# Patient Record
Sex: FEMALE | Race: WHITE | Employment: FULL TIME | URBAN - METROPOLITAN AREA
[De-identification: names, ages, dates, MRNs, and addresses within clinical notes are randomized per-mention and may not be internally consistent; named-entity substitution may affect disease eponyms.]

---
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
## 2022-02-10 ENCOUNTER — HOSPITAL ENCOUNTER (OUTPATIENT)
Age: 22
Setting detail: SPECIMEN
Discharge: HOME | End: 2022-02-10

## 2022-02-10 PROCEDURE — 86762 RUBELLA ANTIBODY: CPT

## 2022-02-10 PROCEDURE — 86765 RUBEOLA ANTIBODY: CPT

## 2022-02-10 PROCEDURE — 86735 MUMPS ANTIBODY: CPT

## 2022-02-10 PROCEDURE — 86787 VARICELLA-ZOSTER ANTIBODY: CPT

## 2022-02-11 LAB
MEASLES ANTIBODY IGG: 1.43
MUV IGG SER QL: 0.78
RUBV IGG SER QL: 67.5 IU/ML
VZV IGG SER QL IA: 1.85

## 2022-03-14 ENCOUNTER — INITIAL PRENATAL (OUTPATIENT)
Dept: OBGYN | Age: 22
End: 2022-03-14
Payer: COMMERCIAL

## 2022-03-14 ENCOUNTER — HOSPITAL ENCOUNTER (OUTPATIENT)
Age: 22
Setting detail: SPECIMEN
Discharge: HOME | End: 2022-03-14
Payer: COMMERCIAL

## 2022-03-14 VITALS — DIASTOLIC BLOOD PRESSURE: 62 MMHG | WEIGHT: 151 LBS | SYSTOLIC BLOOD PRESSURE: 106 MMHG | BODY MASS INDEX: 29.25 KG/M2

## 2022-03-14 DIAGNOSIS — Z3A.12 12 WEEKS GESTATION OF PREGNANCY: ICD-10-CM

## 2022-03-14 DIAGNOSIS — Z34.02 ENCOUNTER FOR SUPERVISION OF NORMAL FIRST PREGNANCY IN SECOND TRIMESTER: ICD-10-CM

## 2022-03-14 DIAGNOSIS — Z34.02 ENCOUNTER FOR SUPERVISION OF NORMAL FIRST PREGNANCY IN SECOND TRIMESTER: Primary | ICD-10-CM

## 2022-03-14 LAB
ABO/RH: NORMAL
AMPHETAMINE SCREEN URINE: NEGATIVE
ANTIBODY SCREEN: NEGATIVE
BARBITURATE SCREEN URINE: NEGATIVE
BENZODIAZEPINE SCREEN, URINE: NEGATIVE
BUPRENORPHINE URINE: NEGATIVE
CANNABINOID SCREEN URINE: NEGATIVE
COCAINE METABOLITE, URINE: NEGATIVE
METHADONE SCREEN, URINE: NEGATIVE
METHAMPHETAMINE, URINE: NEGATIVE
OPIATES, URINE: NEGATIVE
OXYCODONE SCREEN URINE: NEGATIVE
PHENCYCLIDINE, URINE: NEGATIVE
PROPOXYPHENE, URINE: NEGATIVE
TRICYCLIC ANTIDEPRESSANTS, UR: NEGATIVE

## 2022-03-14 PROCEDURE — 87389 HIV-1 AG W/HIV-1&-2 AB AG IA: CPT

## 2022-03-14 PROCEDURE — 87086 URINE CULTURE/COLONY COUNT: CPT

## 2022-03-14 PROCEDURE — 87491 CHLMYD TRACH DNA AMP PROBE: CPT

## 2022-03-14 PROCEDURE — 99211 OFF/OP EST MAY X REQ PHY/QHP: CPT | Performed by: ADVANCED PRACTICE MIDWIFE

## 2022-03-14 PROCEDURE — 86901 BLOOD TYPING SEROLOGIC RH(D): CPT

## 2022-03-14 PROCEDURE — 85025 COMPLETE CBC W/AUTO DIFF WBC: CPT

## 2022-03-14 PROCEDURE — 87340 HEPATITIS B SURFACE AG IA: CPT

## 2022-03-14 PROCEDURE — 80306 DRUG TEST PRSMV INSTRMNT: CPT

## 2022-03-14 PROCEDURE — 0500F INITIAL PRENATAL CARE VISIT: CPT | Performed by: ADVANCED PRACTICE MIDWIFE

## 2022-03-14 PROCEDURE — 86780 TREPONEMA PALLIDUM: CPT

## 2022-03-14 PROCEDURE — 86762 RUBELLA ANTIBODY: CPT

## 2022-03-14 PROCEDURE — 86803 HEPATITIS C AB TEST: CPT

## 2022-03-14 PROCEDURE — 86900 BLOOD TYPING SEROLOGIC ABO: CPT

## 2022-03-14 PROCEDURE — 87591 N.GONORRHOEAE DNA AMP PROB: CPT

## 2022-03-14 PROCEDURE — 86850 RBC ANTIBODY SCREEN: CPT

## 2022-03-14 ASSESSMENT — PATIENT HEALTH QUESTIONNAIRE - PHQ9
SUM OF ALL RESPONSES TO PHQ QUESTIONS 1-9: 0
SUM OF ALL RESPONSES TO PHQ9 QUESTIONS 1 & 2: 0
2. FEELING DOWN, DEPRESSED OR HOPELESS: 0
SUM OF ALL RESPONSES TO PHQ QUESTIONS 1-9: 0
SUM OF ALL RESPONSES TO PHQ QUESTIONS 1-9: 0
1. LITTLE INTEREST OR PLEASURE IN DOING THINGS: 0
SUM OF ALL RESPONSES TO PHQ QUESTIONS 1-9: 0

## 2022-03-14 NOTE — PROGRESS NOTES
Kris Vásquez is here at 12w5d for:    Chief Complaint   Patient presents with   Carol Larger Initial Prenatal Visit       Estimated Due Date: Estimated Date of Delivery: 22    OB History    Para Term  AB Living   1             SAB IAB Ectopic Molar Multiple Live Births                    # Outcome Date GA Lbr Augustus/2nd Weight Sex Delivery Anes PTL Lv   1 Current                 History reviewed. No pertinent past medical history. History reviewed. No pertinent surgical history. Social History     Tobacco Use   Smoking Status Never Smoker   Smokeless Tobacco Never Used        Social History     Substance and Sexual Activity   Alcohol Use Not Currently       No results found for this visit on 22. HPI: Patient here today for initial OB visit transfer in from Dr. Saw Stephen. 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 Weight as of this encounter: 151 lb (68.5 kg). BP: 106/62  Weight: 151 lb (68.5 kg)  Patient Position: Sitting         Results reviewed today:    No results found for this visit on 22. See prenatal vital sign section and fetal assessment section    ASSESSMENT & Plan    Diagnosis Orders   1. Encounter for supervision of normal first pregnancy in second trimester  PRENATAL PROFILE I    Prenatal type and screen    HIV Screen    Hepatitis C Antibody    C.trachomatis N.gonorrhoeae DNA, Urine    Culture, Urine    Urine Drug Screen, Comprehensive   2. 12 weeks gestation of pregnancy               I am having Kris MARYVani Vásquez maintain her Prenatal Vit-Fe Fumarate-FA (PRENATAL 19 PO). Return for Keep next appt.     Patient Instructions       Patient Education        Weeks 10 to 14 of Your Pregnancy: Care Instructions  Overview     By weeks 10 to 14 of your pregnancy, the placenta has formed inside your uterus. The placenta's main job is to give your baby oxygen and nutrients through the umbilical cord. It's possible to hear your baby's heartbeat with a special ultrasound device. Your baby's organs are developing. The arms and legs can bend. This is a good time to think about testing for birth defects. There are two types of tests: screening and diagnostic. Screening tests show the chance that a baby has a certain birth defect. They can't tell you for sure that your baby has a problem. Diagnostic tests show if a baby has a certain birth defect. It's your choice whether to have these tests. You and your partner can talk to your doctor or midwife about tests for birth defects. Follow-up care is a key part of your treatment and safety. Be sure to make and go to all appointments, and call your doctor if you are having problems. It's also a good idea to know your test results and keep a list of the medicines you take. How can you care for yourself at home? Decide about tests  · You can have screening tests and diagnostic tests to check for birth defects. The decision to have a test for birth defects is personal. Think about your age, your chance of passing on a family disease, your need to know about any problems, and what you might do after you have the test results. ? Quadruple (quad) blood test. This screening test can be done between 15 and 22 weeks of pregnancy. It checks the amount of four substances in your blood. The doctor looks at these test results, along with your age and other factors, to find out the chance that your baby may have certain problems. ? Amniocentesis. This diagnostic test is used to look for chromosomal problems in the baby's cells. It can be done between 15 and 20 weeks of pregnancy, usually around week 16.  ? Nuchal translucency test. This test uses ultrasound to measure the thickness of the area at the back of the baby's neck.  An increase in the thickness can be an early sign of Down syndrome. ? Chorionic villus sampling (CVS). This is a test that looks for certain genetic problems with your baby. The same genes that are in your baby are in the placenta. A small piece of the placenta is taken out and tested. This test is done when you are 10 to 13 weeks pregnant. Ease discomfort  · Slow down and take naps when you feel tired. · If your emotions swing, talk to someone. · If your gums bleed, try a softer toothbrush. If your gums are puffy and bleed a lot, see your dentist.  · If you feel dizzy:  ? Get up slowly after sitting or lying down. ? Drink plenty of fluids. ? Eat small snacks to keep your blood sugar stable. ? Put your head between your legs as though you were tying your shoelaces. ? Lie down with your legs higher than your head. Use pillows to prop up your feet. · If you have a headache:  ? Lie down. ? Ask your partner or a good friend for a neck massage. ? Try cool cloths over your forehead or across the back of your neck. ? Use acetaminophen (Tylenol) for pain relief. Do not use nonsteroidal anti-inflammatory drugs (NSAIDs), such as ibuprofen (Advil, Motrin) or naproxen (Aleve), unless your doctor says it is okay. · If you have a nosebleed, pinch your nose gently, and hold it for a short while. To prevent nosebleeds, try massaging a small dab of petroleum jelly, such as Vaseline, in your nostrils. · If your nose is stuffed up, try saline (saltwater) nose sprays. Do not use decongestant sprays. Care for your breasts  · Wear a bra that gives you good support. · Know that changes in your breasts are normal.  ? Your breasts may get larger and more tender. Tenderness usually gets better by 12 weeks. ? Your nipples may get darker and larger, and small bumps around your nipples may show more. ? The veins in your chest and breasts may show more. Where can you learn more? Go to https://cherelle.[employer]. org and sign in to your newScale account.  Enter K728 in cooking and housekeeping.     · Figure out who or what causes your stress. Avoid these people or situations as much as possible.     · Relax every day. Taking 10- to 15-minute breaks can make a big difference. Take a walk, listen to music, or take a warm bath.     · Learn relaxation techniques at prenatal or yoga class. Or buy a relaxation tape.     · List your fears about having a baby and becoming a parent. Share the list with someone you trust. Decide which worries are really small, and try to let them go. Exercise    · If you did not exercise much before pregnancy, start slowly. Walking is best. [employer], and do a little more every day.     · Brisk walking, easy jogging, low-impact aerobics, water aerobics, and yoga are good choices. Some sports, such as scuba diving, horseback riding, downhill skiing, gymnastics, and water skiing, are not a good idea.     · Try to do at least 2½ hours a week of moderate exercise, such as a fast walk. One way to do this is to be active 30 minutes a day, at least 5 days a week.     · Wear loose clothing. And wear shoes and a bra that provide good support.     · Warm up and cool down to start and finish your exercise.     · If you want to use weights, be sure to use light weights. They reduce stress on your joints. Stay at the best weight for you    · Experts recommend that you gain about 1 pound a month during the first 3 months of your pregnancy.     · Experts recommend that you gain about 1 pound a week during your last 6 months of pregnancy, for a total weight gain of 25 to 35 pounds.     · If you are underweight, you will need to gain more weight (about 28 to 40 pounds).     · If you are overweight, you may not need to gain as much weight (about 15 to 25 pounds).     · If you are gaining weight too fast, use common sense. Exercise every day, and limit sweets, fast foods, and fats.  Choose lean meats, fruits, and vegetables.     · If you are having twins or more, your doctor may refer you to a dietitian. Where can you learn more? Go to https://chpepiceweb.health-partners. org and sign in to your "[employer]t account. Enter Q910 in the [facility] box to learn more about \"Weeks 14 to 18 of Your Pregnancy: Care Instructions. \"     If you do not have an account, please click on the \"Sign Up Now\" link. Current as of: 2021               Content Version: 13.1  © 2166-1757 Healthwise, Incorporated. Care instructions adapted under license by [facility] ([facility]). If you have questions about a medical condition or this instruction, always ask your healthcare professional. [place] [address] any warranty or liability for your use of this information. Childbirth Education 2022 &  ( 6pm -9:30)     &  ( 6:00-9:30pm)     (Saturday 9am-4pm)     ( 1pm-5pm) accelerated class      There is no charge for classes. Please bring a pillow to class. Bring a support person. To sign up for classes  Call the Obstetrics Department at  [facility] at  [phone]          BREASTFEEDING classes     Classes are held in the [address] [facility]     Breastfeeding     ZOOM CLASS- (Wednesday 12 pm-1:30 pm)       (Tuesday 11 am - 1 pm)      (Monday 5:30-7:30 pm)        these classes are free    To sign up for classes  call the Obstetrics Department at  [facility] at  [phone]  More classes will be added as instructor availability increases. Thank you for your patience! Learn and GO with Inge              Scan the QR Code  below to access   parent education powered  by [employer].     [employer] gives you access to:     Prenatal   Labor and birth   Postpartum care   Breastfeeding   [place] care  Scan the QR code to access  [employer] at [facility] ([facility]) -- [address] [facility], APRN - CNM,3/14/2022 4:53 PM

## 2022-03-14 NOTE — PATIENT INSTRUCTIONS
Patient Education        Weeks 10 to 14 of Your Pregnancy: Care Instructions  Overview     By weeks 10 to 15 of your pregnancy, the placenta has formed inside your uterus. The placenta's main job is to give your baby oxygen and nutrients through the umbilical cord. It's possible to hear your baby's heartbeat with a special ultrasound device. Your baby's organs are developing. The arms and legs can bend. This is a good time to think about testing for birth defects. There are two types of tests: screening and diagnostic. Screening tests show the chance that a baby has a certain birth defect. They can't tell you for sure that your baby has a problem. Diagnostic tests show if a baby has a certain birth defect. It's your choice whether to have these tests. You and your partner can talk to your doctor or midwife about tests for birth defects. Follow-up care is a key part of your treatment and safety. Be sure to make and go to all appointments, and call your doctor if you are having problems. It's also a good idea to know your test results and keep a list of the medicines you take. How can you care for yourself at home? Decide about tests  · You can have screening tests and diagnostic tests to check for birth defects. The decision to have a test for birth defects is personal. Think about your age, your chance of passing on a family disease, your need to know about any problems, and what you might do after you have the test results. ? Quadruple (quad) blood test. This screening test can be done between 15 and 22 weeks of pregnancy. It checks the amount of four substances in your blood. The doctor looks at these test results, along with your age and other factors, to find out the chance that your baby may have certain problems. ? Amniocentesis. This diagnostic test is used to look for chromosomal problems in the baby's cells.  It can be done between 15 and 20 weeks of pregnancy, usually around week 16.  ? Nuchal translucency test. This test uses ultrasound to measure the thickness of the area at the back of the baby's neck. An increase in the thickness can be an early sign of Down syndrome. ? Chorionic villus sampling (CVS). This is a test that looks for certain genetic problems with your baby. The same genes that are in your baby are in the placenta. A small piece of the placenta is taken out and tested. This test is done when you are 10 to 13 weeks pregnant. Ease discomfort  · Slow down and take naps when you feel tired. · If your emotions swing, talk to someone. · If your gums bleed, try a softer toothbrush. If your gums are puffy and bleed a lot, see your dentist.  · If you feel dizzy:  ? Get up slowly after sitting or lying down. ? Drink plenty of fluids. ? Eat small snacks to keep your blood sugar stable. ? Put your head between your legs as though you were tying your shoelaces. ? Lie down with your legs higher than your head. Use pillows to prop up your feet. · If you have a headache:  ? Lie down. ? Ask your partner or a good friend for a neck massage. ? Try cool cloths over your forehead or across the back of your neck. ? Use acetaminophen (Tylenol) for pain relief. Do not use nonsteroidal anti-inflammatory drugs (NSAIDs), such as ibuprofen (Advil, Motrin) or naproxen (Aleve), unless your doctor says it is okay. · If you have a nosebleed, pinch your nose gently, and hold it for a short while. To prevent nosebleeds, try massaging a small dab of petroleum jelly, such as Vaseline, in your nostrils. · If your nose is stuffed up, try saline (saltwater) nose sprays. Do not use decongestant sprays. Care for your breasts  · Wear a bra that gives you good support. · Know that changes in your breasts are normal.  ? Your breasts may get larger and more tender. Tenderness usually gets better by 12 weeks. ? Your nipples may get darker and larger, and small bumps around your nipples may show more. ?  The veins in your chest and breasts may show more. Where can you learn more? Go to https://chpepiceweb.healthSkyengpartners. org and sign in to your [employer] account. Enter B556 in the [facility] box to learn more about \"Weeks 10 to 14 of Your Pregnancy: Care Instructions. \"     If you do not have an account, please click on the \"Sign Up Now\" link. Current as of: June 16, 2021               Content Version: 13.1  © 3876-9355 [employer]. Care instructions adapted under license by [facility] ([facility]). If you have questions about a medical condition or this instruction, always ask your healthcare professional. [place] [address] any warranty or liability for your use of this information. Patient Education        Weeks 14 to 18 of Your Pregnancy: Care Instructions  Overview     During this time, you may start to \"show,\" so that you look pregnant to people around you. You may also notice some changes in your skin, such as itchy spots on your palms or acne on your face. Your baby is now able to pass urine. And your baby's first stool (meconium) is starting to collect in your baby's intestines. Hair is also starting to grow on your baby's head. At your next visit, between weeks 18 and 20, your doctor may do an ultrasound test. The test allows your doctor to check for certain problems. Your doctor can also tell the sex of your baby. So this a good time to think about whether you want to know. Talk to your doctor about getting a flu shot to help keep you healthy during your pregnancy. As your pregnancy moves along, it's common to worry or feel anxious. Your body is changing a lot. And you are thinking about giving birth, the health of your baby, and becoming a parent. You can talk to your doctor about any anxiety and stress you feel. Follow-up care is a key part of your treatment and safety. Be sure to make and go to all appointments, and call your doctor if you are having problems.  It's also a good idea to know your test results and keep a list of the medicines you take. How can you care for yourself at home? Reduce stress    · Ask for help with cooking and housekeeping.     · Figure out who or what causes your stress. Avoid these people or situations as much as possible.     · Relax every day. Taking 10- to 15-minute breaks can make a big difference. Take a walk, listen to music, or take a warm bath.     · Learn relaxation techniques at prenatal or yoga class. Or buy a relaxation tape.     · List your fears about having a baby and becoming a parent. Share the list with someone you trust. Decide which worries are really small, and try to let them go. Exercise    · If you did not exercise much before pregnancy, start slowly. Walking is best. [employer], and do a little more every day.     · Brisk walking, easy jogging, low-impact aerobics, water aerobics, and yoga are good choices. Some sports, such as scuba diving, horseback riding, downhill skiing, gymnastics, and water skiing, are not a good idea.     · Try to do at least 2½ hours a week of moderate exercise, such as a fast walk. One way to do this is to be active 30 minutes a day, at least 5 days a week.     · Wear loose clothing. And wear shoes and a bra that provide good support.     · Warm up and cool down to start and finish your exercise.     · If you want to use weights, be sure to use light weights. They reduce stress on your joints.    Stay at the best weight for you    · Experts recommend that you gain about 1 pound a month during the first 3 months of your pregnancy.     · Experts recommend that you gain about 1 pound a week during your last 6 months of pregnancy, for a total weight gain of 25 to 35 pounds.     · If you are underweight, you will need to gain more weight (about 28 to 40 pounds).     · If you are overweight, you may not need to gain as much weight (about 15 to 25 pounds).     · If you are gaining weight too fast, use common sense. Exercise every day, and limit sweets, fast foods, and fats. Choose lean meats, fruits, and vegetables.     · If you are having twins or more, your doctor may refer you to a dietitian. Where can you learn more? Go to https://chbobo.healthWellntelpartners. org and sign in to your [employer] account. Enter X517 in the [facility] box to learn more about \"Weeks 14 to 18 of Your Pregnancy: Care Instructions. \"     If you do not have an account, please click on the \"Sign Up Now\" link. Current as of: June 16, 2021               Content Version: 13.1  © 9667-5802 Healthwise, [employer]. Care instructions adapted under license by [facility] ([facility]). If you have questions about a medical condition or this instruction, always ask your healthcare professional. [address] any warranty or liability for your use of this information. Childbirth Education 2022 January 18 & 25 (Tuesdays 6pm -9:30)    February 9 & 23 (Wednesdays 6:00-9:30pm)    February 5 (Saturday 9am-4pm)    March 6 (Sunday 1pm-5pm) accelerated class      There is no charge for classes. Please bring a pillow to class. Bring a support person. To sign up for classes  Call the Obstetrics Department at  [facility] at  [phone]          BREASTFEEDING classes 2022    Classes are held in the [address] [facility] [address]    Breastfeeding    January 26 ZOOM CLASS- (Wednesday 12 pm-1:30 pm)    February 1   (Tuesday 11 am - 1 pm)     March 7 (Monday 5:30-7:30 pm)        these classes are free    To sign up for classes  call the Obstetrics Department at  [facility] at  [phone]  More classes will be added as instructor availability increases. Thank you for your patience! Learn and GO with Inge Wilburn the QR Code  below to access 24/7  parent education powered  by [employer].     [employer] gives you access to:     Prenatal   Labor and birth  Kera Busch Postpartum care   Breastfeeding   [place] care  Scan the QR code to access  [place] at [facility].

## 2022-03-14 NOTE — PROGRESS NOTES
New OB Visit    Date of service: 3/14/2022    Kris Vásquez  Is a 19 y.o.  female presenting for a New OB visit with Nurse. Name of Father of Tracie Perla is Nathalie Vásquez and is involved. Pt does work at [employer]. Pt is not Fertility pt. PT's PCP is: Marsha Gonzalez DO     : 2000                                             Subjective:       Patient's last menstrual period was 12/15/2021. OB History    Para Term  AB Living   1             SAB IAB Ectopic Molar Multiple Live Births                    # Outcome Date GA Lbr Augustus/2nd Weight Sex Delivery Anes PTL Lv   1 Current                      Social History     Tobacco Use   Smoking Status Never Smoker   Smokeless Tobacco Never Used        Social History     Substance and Sexual Activity   Alcohol Use Not Currently        Allergies: Patient has no known allergies. Current Outpatient Medications:     Prenatal Vit-Fe Fumarate-FA (PRENATAL 19 PO), Take by mouth, Disp: , Rfl:       Vital Signs Last menstrual period 12/15/2021. No results found for this visit on 22. Pain: none                            Nausea: no      Vomiting: no        Breast enlargement or tenderness: yes    Frequency of urination:yes      Fatigue: no         Patient history reviewed. Educational materials given and genetic testing reviewed. Breastfeeding handouts given and reviewed: Yes     If BMI over 35 was HgA1C drawn: N/A      If history of thyroid problem was TSH drawn: no       My chart set up and activated: Yes    If history of preeclampsia did we start baby ASA: N/A    If history of  delivery - did we set up progesterone injections:  N/A    Does pt have a history of DVT? no  If yes start Lovenox 40 mg daily    Is pt allergic to PCN? No:   If yes order U/A to culture and sensitivity if positive. Education:  There are no Patient Instructions on file for this visit. Assessment:      Diagnosis Orders   1.  Encounter for supervision of normal first pregnancy in second trimester     2.  Amenorrhea         Plan: Order Routine Prenatal Lab Yes     Order additional testing if requested         Order Prenatal Vitamins   No: OTC             Appointment with Ronni Rodríguez CNM in 3 weeks for Dating U/S and total body exam if indicated      Nurse:   James Lucas

## 2022-03-15 LAB
ABSOLUTE EOS #: 0.03 K/UL (ref 0–0.44)
ABSOLUTE IMMATURE GRANULOCYTE: 0.08 K/UL (ref 0–0.3)
ABSOLUTE LYMPH #: 1.96 K/UL (ref 1.1–3.7)
ABSOLUTE MONO #: 0.81 K/UL (ref 0.1–1.2)
BASOPHILS # BLD: 0 % (ref 0–2)
BASOPHILS ABSOLUTE: 0.03 K/UL (ref 0–0.2)
CULTURE: NORMAL
EOSINOPHILS RELATIVE PERCENT: 0 % (ref 1–4)
HCT VFR BLD CALC: 41.2 % (ref 36.3–47.1)
HEMOGLOBIN: 13.8 G/DL (ref 11.9–15.1)
HEPATITIS B SURFACE ANTIGEN: NONREACTIVE
HEPATITIS C ANTIBODY: NONREACTIVE
HIV AG/AB: NONREACTIVE
IMMATURE GRANULOCYTES: 1 %
LYMPHOCYTES # BLD: 19 % (ref 24–43)
MCH RBC QN AUTO: 31.4 PG (ref 25.2–33.5)
MCHC RBC AUTO-ENTMCNC: 33.5 G/DL (ref 28.4–34.8)
MCV RBC AUTO: 93.6 FL (ref 82.6–102.9)
MONOCYTES # BLD: 8 % (ref 3–12)
NRBC AUTOMATED: 0 PER 100 WBC
PDW BLD-RTO: 12.1 % (ref 11.8–14.4)
PLATELET # BLD: 311 K/UL (ref 138–453)
PMV BLD AUTO: 10.1 FL (ref 8.1–13.5)
RBC # BLD: 4.4 M/UL (ref 3.95–5.11)
RUBV IGG SER QL: 72.5 IU/ML
SEG NEUTROPHILS: 72 % (ref 36–65)
SEGMENTED NEUTROPHILS ABSOLUTE COUNT: 7.48 K/UL (ref 1.5–8.1)
SPECIMEN DESCRIPTION: NORMAL
T. PALLIDUM, IGG: NONREACTIVE
WBC # BLD: 10.4 K/UL (ref 3.5–11.3)

## 2022-03-16 LAB
C. TRACHOMATIS DNA ,URINE: NEGATIVE
N. GONORRHOEAE DNA, URINE: NEGATIVE
SPECIMEN DESCRIPTION: NORMAL

## 2022-04-04 ENCOUNTER — ROUTINE PRENATAL (OUTPATIENT)
Dept: OBGYN | Age: 22
End: 2022-04-04

## 2022-04-04 VITALS — DIASTOLIC BLOOD PRESSURE: 82 MMHG | SYSTOLIC BLOOD PRESSURE: 132 MMHG | BODY MASS INDEX: 29.25 KG/M2 | WEIGHT: 151 LBS

## 2022-04-04 DIAGNOSIS — Z34.02 SUPERVISION OF NORMAL FIRST PREGNANCY IN SECOND TRIMESTER: ICD-10-CM

## 2022-04-04 DIAGNOSIS — Z3A.15 15 WEEKS GESTATION OF PREGNANCY: Primary | ICD-10-CM

## 2022-04-04 PROCEDURE — 0502F SUBSEQUENT PRENATAL CARE: CPT | Performed by: ADVANCED PRACTICE MIDWIFE

## 2022-04-04 NOTE — PROGRESS NOTES
Kris Vásquez is here at 15w5d for:    Chief Complaint   Patient presents with    Routine Prenatal Visit     pt states no issues or concerns        Estimated Due Date: Estimated Date of Delivery: 22    OB History    Para Term  AB Living   1             SAB IAB Ectopic Molar Multiple Live Births                    # Outcome Date GA Lbr Augustus/2nd Weight Sex Delivery Anes PTL Lv   1 Current                 History reviewed. No pertinent past medical history. History reviewed. No pertinent surgical history. Social History     Tobacco Use   Smoking Status Never Smoker   Smokeless Tobacco Never Used        Social History     Substance and Sexual Activity   Alcohol Use Not Currently       No results found for this visit on 22. HPI: Patient here today for OB visit. Patient states she is doing well and denies needs at this time    Yes PT denies fever, chills, nausea and vomiting       Vitals:  Estimated body mass index is 29.25 kg/m² as calculated from the following:    Height as of 17: 5' 0.25\" (1.53 m). Weight as of this encounter: 151 lb (68.5 kg). BP: 132/82  Weight: 151 lb (68.5 kg)  Patient Position: Sitting  Albumin: Negative  Glucose: Negative  Fetal Heart Rate: 148  Movement: Absent           Abdomen: u/3 deep  Results reviewed today:    No results found for this visit on 22. ASSESSMENT & Plan    Diagnosis Orders   1. 15 weeks gestation of pregnancy     2. Supervision of normal first pregnancy in second trimester               I am having Kris Vásquez maintain her Prenatal Vit-Fe Fumarate-FA (PRENATAL 19 PO). Return for Keep next appt. Patient Instructions       Patient Education        Weeks 14 to 25 of Your Pregnancy: Care Instructions  Overview     During this time, you may start to \"show,\" so that you look pregnant to people around you. You may also notice some changes in your skin, such as itchy spotson your palms or acne on your face.   Your baby is now able to pass urine. And your baby's first stool (meconium) is starting to collect in your baby's intestines. Hair is also starting to grow onyour baby's head. At your next visit, between weeks 18 and 20, your doctor may do an ultrasound test. The test allows your doctor to check for certain problems. Your doctor can also tell the sex of your baby. So this a good time to think about whetheryou want to know. Talk to your doctor about getting a flu shot to help keep you healthy duringyour pregnancy. As your pregnancy moves along, it's common to worry or feel anxious. Your body is changing a lot. And you are thinking about giving birth, the health of your baby, and becoming a parent. You can talk to your doctor about any anxiety andstress you feel. Follow-up care is a key part of your treatment and safety. Be sure to make and go to all appointments, and call your doctor if you are having problems. It's also a good idea to know your test results and keep alist of the medicines you take. How can you care for yourself at home? Reduce stress     Ask for help with cooking and housekeeping.      Figure out who or what causes your stress. Avoid these people or situations as much as possible.      Relax every day. Taking 10- to 15-minute breaks can make a big difference. Take a walk, listen to music, or take a warm bath.      Learn relaxation techniques at prenatal or yoga class. Or buy a relaxation tape.      List your fears about having a baby and becoming a parent. Share the list with someone you trust. Decide which worries are really small, and try to let them go. Exercise     If you did not exercise much before pregnancy, start slowly. Walking is best. [employer], and do a little more every day.      Brisk walking, easy jogging, low-impact aerobics, water aerobics, and yoga are good choices.  Some sports, such as scuba diving, horseback riding, downhill skiing, gymnastics, and water skiing, are not a good idea.      Try to do at least 2½ hours a week of moderate exercise, such as a fast walk. One way to do this is to be active 30 minutes a day, at least 5 days a week.      Wear loose clothing. And wear shoes and a bra that provide good support.      Warm up and cool down to start and finish your exercise.      If you want to use weights, be sure to use light weights. They reduce stress on your joints. Stay at the best weight for you     Experts recommend that you gain about 1 pound a month during the first 3 months of your pregnancy.      Experts recommend that you gain about 1 pound a week during your last 6 months of pregnancy, for a total weight gain of 25 to 35 pounds.      If you are underweight, you will need to gain more weight (about 28 to 40 pounds).      If you are overweight, you may not need to gain as much weight (about 15 to 25 pounds).      If you are gaining weight too fast, use common sense. Exercise every day, and limit sweets, fast foods, and fats. Choose lean meats, fruits, and vegetables.      If you are having twins or more, your doctor may refer you to a dietitian. Where can you learn more? Go to https://ICE Entertainment.[employer]el Booking Solutions Incorporated. org and sign in to your Eventure Interactive account. Enter Z629 in the [facility] box to learn more about \"Weeks 14 to 18 of Your Pregnancy: Care Instructions. \"     If you do not have an account, please click on the \"Sign Up Now\" link. Current as of: June 16, 2021               Content Version: 13.2  © 2006-2022 Healthwise, Incorporated. Care instructions adapted under license by [facility] ([facility]). If you have questions about a medical condition or this instruction, always ask your healthcare professional. [place] [address] any warranty or liability for your use of this information.                    GORDO Mccrary CNM,4/4/2022 4:21 PM

## 2022-04-04 NOTE — PATIENT INSTRUCTIONS
Patient Education        Weeks 14 to 18 of Your Pregnancy: Care Instructions  Overview     During this time, you may start to \"show,\" so that you look pregnant to people around you. You may also notice some changes in your skin, such as itchy spotson your palms or acne on your face. Your baby is now able to pass urine. And your baby's first stool (meconium) is starting to collect in your baby's intestines. Hair is also starting to grow onyour baby's head. At your next visit, between weeks 18 and 20, your doctor may do an ultrasound test. The test allows your doctor to check for certain problems. Your doctor can also tell the sex of your baby. So this a good time to think about whetheryou want to know. Talk to your doctor about getting a flu shot to help keep you healthy duringyour pregnancy. As your pregnancy moves along, it's common to worry or feel anxious. Your body is changing a lot. And you are thinking about giving birth, the health of your baby, and becoming a parent. You can talk to your doctor about any anxiety andstress you feel. Follow-up care is a key part of your treatment and safety. Be sure to make and go to all appointments, and call your doctor if you are having problems. It's also a good idea to know your test results and keep alist of the medicines you take. How can you care for yourself at home? Reduce stress     Ask for help with cooking and housekeeping.      Figure out who or what causes your stress. Avoid these people or situations as much as possible.      Relax every day. Taking 10- to 15-minute breaks can make a big difference. Take a walk, listen to music, or take a warm bath.      Learn relaxation techniques at prenatal or yoga class. Or buy a relaxation tape.      List your fears about having a baby and becoming a parent. Share the list with someone you trust. Decide which worries are really small, and try to let them go.    Exercise     If you did not exercise much before pregnancy, start slowly. Walking is best. [employer], and do a little more every day.      Brisk walking, easy jogging, low-impact aerobics, water aerobics, and yoga are good choices. Some sports, such as scuba diving, horseback riding, downhill skiing, gymnastics, and water skiing, are not a good idea.      Try to do at least 2½ hours a week of moderate exercise, such as a fast walk. One way to do this is to be active 30 minutes a day, at least 5 days a week.      Wear loose clothing. And wear shoes and a bra that provide good support.      Warm up and cool down to start and finish your exercise.      If you want to use weights, be sure to use light weights. They reduce stress on your joints. Stay at the best weight for you     Experts recommend that you gain about 1 pound a month during the first 3 months of your pregnancy.      Experts recommend that you gain about 1 pound a week during your last 6 months of pregnancy, for a total weight gain of 25 to 35 pounds.      If you are underweight, you will need to gain more weight (about 28 to 40 pounds).      If you are overweight, you may not need to gain as much weight (about 15 to 25 pounds).      If you are gaining weight too fast, use common sense. Exercise every day, and limit sweets, fast foods, and fats. Choose lean meats, fruits, and vegetables.      If you are having twins or more, your doctor may refer you to a dietitian. Where can you learn more? Go to https://LoopportlavernTanner Research.Getyoo. org and sign in to your [employer] account. Enter Y252 in the [facility] box to learn more about \"Weeks 14 to 18 of Your Pregnancy: Care Instructions. \"     If you do not have an account, please click on the \"Sign Up Now\" link. Current as of: June 16, 2021               Content Version: 13.2  © 6395-0711 Healthwise, Incorporated. Care instructions adapted under license by [facility] ([facility]).  If you have questions about a medical condition or this instruction, always ask your healthcare professional. [place] [address] any warranty or liability for your use of this information.

## 2022-05-02 ENCOUNTER — ROUTINE PRENATAL (OUTPATIENT)
Dept: OBGYN | Age: 22
End: 2022-05-02

## 2022-05-02 VITALS — DIASTOLIC BLOOD PRESSURE: 74 MMHG | SYSTOLIC BLOOD PRESSURE: 120 MMHG | WEIGHT: 154.2 LBS | BODY MASS INDEX: 29.87 KG/M2

## 2022-05-02 DIAGNOSIS — Z36.89 SCREENING, ANTENATAL, FOR FETAL ANATOMIC SURVEY: ICD-10-CM

## 2022-05-02 DIAGNOSIS — Z3A.20 20 WEEKS GESTATION OF PREGNANCY: Primary | ICD-10-CM

## 2022-05-02 PROCEDURE — 0502F SUBSEQUENT PRENATAL CARE: CPT | Performed by: ADVANCED PRACTICE MIDWIFE

## 2022-05-02 NOTE — PATIENT INSTRUCTIONS
Patient Education        Learning About When to Call Your Doctor During Pregnancy (After 20 Weeks)  Overview  It's common to have concerns about what might be a problem when you're pregnant. Most pregnancies don't have any serious problems. But it's still important to know when to call your doctor if you have certain symptoms orsigns of labor. These are general suggestions. Your doctor may give you some more informationabout when to call. When to call your doctor (after 20 weeks)  Call 911 anytime you think you may need emergency care. For example, call if:   You have severe vaginal bleeding.  You have sudden, severe pain in your belly.  You passed out (lost consciousness).  You have a seizure.  You see or feel the umbilical cord.  You think you are about to deliver your baby and can't make it safely to the hospital.  Call your doctor now or seek immediate medical care if:   You have vaginal bleeding.  You have belly pain.  You have a fever.  You have symptoms of preeclampsia, such as:  ? Sudden swelling of your face, hands, or feet. ? New vision problems (such as dimness, blurring, or seeing spots). ? A severe headache.  You have a sudden release of fluid from your vagina. (You think your water broke.)   You think that you may be in labor. This means that you've had at least 6 contractions in an hour.  You notice that your baby has stopped moving or is moving much less than normal.   You have symptoms of a urinary tract infection. These may include:  ? Pain or burning when you urinate. ? A frequent need to urinate without being able to pass much urine. ? Pain in the flank, which is just below the rib cage and above the waist on either side of the back. ? Blood in your urine. Watch closely for changes in your health, and be sure to contact your doctor if:   You have vaginal discharge that smells bad.  You have skin changes, such as:  ? A rash. ? Itching.   ? Yellow color to your skin.   You have other concerns about your pregnancy. If you have labor signs at 37 weeks or more  If you have signs of labor at 37 weeks or more, your doctor may tell you tocall when your labor becomes more active. Symptoms of active labor include:   Contractions that are regular.  Contractions that are less than 5 minutes apart.  Contractions that are hard to talk through. Follow-up care is a key part of your treatment and safety. Be sure to make and go to all appointments, and call your doctor if you are having problems. It's also a good idea to know your test results and keep alist of the medicines you take. Where can you learn more? Go to https://KonnektidpeConcard.Ciapple. org and sign in to your SiteJabber account. Enter  in the Kaggle box to learn more about \"Learning About When to Call Your Doctor During Pregnancy (After 20 Weeks). \"     If you do not have an account, please click on the \"Sign Up Now\" link. Current as of: June 16, 2021               Content Version: 13.2  © 2006-2022 Healthwise, Incorporated. Care instructions adapted under license by [facility] ([facility]). If you have questions about a medical condition or this instruction, always ask your healthcare professional. [place] [address] any warranty or liability for your use of this information.

## 2022-05-02 NOTE — PROGRESS NOTES
Kriswilliam Vásquez is here at 19w5d for:    Chief Complaint   Patient presents with    Routine Prenatal Visit     F/U in house 20 week u/s. girl. pt states no issues or concerns        Estimated Due Date: Estimated Date of Delivery: 22    OB History    Para Term  AB Living   1             SAB IAB Ectopic Molar Multiple Live Births                    # Outcome Date GA Lbr Augustus/2nd Weight Sex Delivery Anes PTL Lv   1 Current                 History reviewed. No pertinent past medical history. History reviewed. No pertinent surgical history. Social History     Tobacco Use   Smoking Status Never Smoker   Smokeless Tobacco Never Used        Social History     Substance and Sexual Activity   Alcohol Use Not Currently       No results found for this visit on 22. HPI: Patient here today for OB visit and 20-week ultrasound. Patient denies needs or concerns at this time    Yes PT denies fever, chills, nausea and vomiting       Vitals:  Estimated body mass index is 29.87 kg/m² as calculated from the following:    Height as of 17: 5' 0.25\" (1.53 m). Weight as of this encounter: 154 lb 3.2 oz (69.9 kg). BP: 120/74  Weight: 154 lb 3.2 oz (69.9 kg)  Patient Position: Sitting  Albumin: Negative  Glucose: Negative  Fetal Heart Rate: 140  Movement: Present           Abdomen: soft    Results reviewed today:       19.6 WK IUP  CL: 4.7 cm  HR: 140 bpm  Anterior placenta, breech presentation  Ovaries: WNL  Gender:  female  Active fetal movements  All visualized fetal anatomy WNL   CP cysts noted      ASSESSMENT & Plan    Diagnosis Orders   1. 20 weeks gestation of pregnancy     2. Screening, , for fetal anatomic survey               I am having Kris HERNANDEZVani Fahad maintain her Prenatal Vit-Fe Fumarate-FA (PRENATAL 19 PO). Return in about 4 weeks (around 2022) for OB & 7 wk OB GTT.     Patient Instructions     Patient Education        Learning About When to Call Your Doctor During Pregnancy (After 20 Weeks)  Overview  It's common to have concerns about what might be a problem when you're pregnant. Most pregnancies don't have any serious problems. But it's still important to know when to call your doctor if you have certain symptoms orsigns of labor. These are general suggestions. Your doctor may give you some more informationabout when to call. When to call your doctor (after 20 weeks)  Call 911 anytime you think you may need emergency care. For example, call if:   You have severe vaginal bleeding.  You have sudden, severe pain in your belly.  You passed out (lost consciousness).  You have a seizure.  You see or feel the umbilical cord.  You think you are about to deliver your baby and can't make it safely to the hospital.  Call your doctor now or seek immediate medical care if:   You have vaginal bleeding.  You have belly pain.  You have a fever.  You have symptoms of preeclampsia, such as:  ? Sudden swelling of your face, hands, or feet. ? New vision problems (such as dimness, blurring, or seeing spots). ? A severe headache.  You have a sudden release of fluid from your vagina. (You think your water broke.)   You think that you may be in labor. This means that you've had at least 6 contractions in an hour.  You notice that your baby has stopped moving or is moving much less than normal.   You have symptoms of a urinary tract infection. These may include:  ? Pain or burning when you urinate. ? A frequent need to urinate without being able to pass much urine. ? Pain in the flank, which is just below the rib cage and above the waist on either side of the back. ? Blood in your urine. Watch closely for changes in your health, and be sure to contact your doctor if:   You have vaginal discharge that smells bad.  You have skin changes, such as:  ? A rash. ? Itching. ? Yellow color to your skin.  You have other concerns about your pregnancy.   If you have labor signs at 37 weeks or more  If you have signs of labor at 37 weeks or more, your doctor may tell you tocall when your labor becomes more active. Symptoms of active labor include:   Contractions that are regular.  Contractions that are less than 5 minutes apart.  Contractions that are hard to talk through. Follow-up care is a key part of your treatment and safety. Be sure to make and go to all appointments, and call your doctor if you are having problems. It's also a good idea to know your test results and keep alist of the medicines you take. Where can you learn more? Go to https://EyesBotpeSwitchcam.Excep Apps. org and sign in to your Cozy Cloud account. Enter  in the [employer] box to learn more about \"Learning About When to Call Your Doctor During Pregnancy (After 20 Weeks). \"     If you do not have an account, please click on the \"Sign Up Now\" link. Current as of: June 16, 2021               Content Version: 13.2  © 2006-2022 Healthwise, Incorporated. Care instructions adapted under license by [facility] ([facility]). If you have questions about a medical condition or this instruction, always ask your healthcare professional. [place] [address] any warranty or liability for your use of this information.                    GORDO Marcelino CNM,5/2/2022 4:41 PM

## 2022-06-02 ENCOUNTER — ROUTINE PRENATAL (OUTPATIENT)
Dept: OBGYN | Age: 22
End: 2022-06-02

## 2022-06-02 VITALS
BODY MASS INDEX: 31.57 KG/M2 | SYSTOLIC BLOOD PRESSURE: 122 MMHG | HEART RATE: 82 BPM | DIASTOLIC BLOOD PRESSURE: 74 MMHG | WEIGHT: 163 LBS

## 2022-06-02 DIAGNOSIS — Z34.02 SUPERVISION OF NORMAL FIRST PREGNANCY IN SECOND TRIMESTER: ICD-10-CM

## 2022-06-02 DIAGNOSIS — Z3A.24 24 WEEKS GESTATION OF PREGNANCY: Primary | ICD-10-CM

## 2022-06-02 PROCEDURE — 0502F SUBSEQUENT PRENATAL CARE: CPT | Performed by: ADVANCED PRACTICE MIDWIFE

## 2022-06-02 NOTE — PROGRESS NOTES
Kris Vásquez is here at 24w1d for:    Chief Complaint   Patient presents with    Routine Prenatal Visit     pt states no issues or concerns        Estimated Due Date: Estimated Date of Delivery: 22    OB History    Para Term  AB Living   1             SAB IAB Ectopic Molar Multiple Live Births                    # Outcome Date GA Lbr Augustus/2nd Weight Sex Delivery Anes PTL Lv   1 Current                 History reviewed. No pertinent past medical history. History reviewed. No pertinent surgical history. Social History     Tobacco Use   Smoking Status Never Smoker   Smokeless Tobacco Never Used        Social History     Substance and Sexual Activity   Alcohol Use Not Currently       No results found for this visit on 22. HPI: Doing well today. Patient denies needs or concerns at this time    Yes PT denies fever, chills, nausea and vomiting       Vitals:  Estimated body mass index is 31.57 kg/m² as calculated from the following:    Height as of 17: 5' 0.25\" (1.53 m). Weight as of this encounter: 163 lb (73.9 kg). BP: 122/74  Weight: 163 lb (73.9 kg)  Heart Rate: 82  Patient Position: Sitting  Albumin: Negative  Glucose: Negative  Fundal Height (cm): 24 cm  Fetal Heart Rate: 144  Movement: Present           Abdomen: Nontender    Results reviewed today:    No results found for this visit on 22. ASSESSMENT & Plan    Diagnosis Orders   1. 24 weeks gestation of pregnancy     2. Supervision of normal first pregnancy in second trimester               I am having Kris Vásquez maintain her Prenatal Vit-Fe Fumarate-FA (PRENATAL 19 PO). Return for Keep next appt. Patient Instructions     Patient Education        Weeks 22 to 32 of Your Pregnancy: Care Instructions  Overview     As you enter your 7th month of pregnancy at week 26, your baby's lungs are growing stronger and getting ready to breathe.  You may notice that your baby responds to the sound of your voice. You may also notice that your baby does less turning and twisting and more squirming, kicking, or jerking. Jerking often means that your baby has hiccups. Hiccups are normal and are onlytemporary. You may want to think about attending a childbirth preparation class. This is also a good time to start thinking about whether you want to have pain medicineduring labor. You may be tested for gestational diabetes between weeks 25 and 28. Gestational diabetes occurs when your blood sugar level gets too high when you're pregnant. The test is important, because you can have gestational diabetes and not knowit. But the condition can cause problems for your baby. Follow-up care is a key part of your treatment and safety. Be sure to make and go to all appointments, and call your doctor if you are having problems. It's also a good idea to know your test results and keep alist of the medicines you take. How can you care for yourself at home? Ease discomfort from your baby's kicking   Change your position. Sometimes this will cause your baby to change position too.  Take a deep breath while you raise your arm over your head. Then breathe out while you drop your arm. Do Kegel exercises to prevent urine from leaking   You can do Kegel exercises while you stand or sit. ? Squeeze the same muscles you would use to stop your urine. Your belly and thighs should not move. ? Hold the squeeze for 3 seconds, and then relax for 3 seconds. ? Start with 3 seconds. Then add 1 second each week until you are able to squeeze for 10 seconds. ? Repeat the exercise 10 to 15 times for each session. Do three or more sessions each day. Ease or reduce swelling in your feet, ankles, hands, and fingers   If your fingers are puffy, take off your rings.  Do not eat high-salt foods, such as potato chips.  Prop up your feet on a stool or couch as much as possible. Sleep with pillows under your feet.    Do not stand for long periods of time or wear tight shoes.  Wear support stockings. Where can you learn more? Go to https://chpepiceweb.healthTailored Games. org and sign in to your Tactonic Technologies account. Enter G264 in the [facility] box to learn more about \"Weeks 22 to 26 of Your Pregnancy: Care Instructions. \"     If you do not have an account, please click on the \"Sign Up Now\" link. Current as of: June 16, 2021               Content Version: 13.2  © 6360-0988 Healthwise, Incorporated. Care instructions adapted under license by [facility] ([facility]). If you have questions about a medical condition or this instruction, always ask your healthcare professional. [place] [address] any warranty or liability for your use of this information.                    GORDO Pimentel CNM,6/2/2022 4:22 PM

## 2022-06-02 NOTE — PATIENT INSTRUCTIONS
Patient Education        Weeks 22 to 26 of Your Pregnancy: Care Instructions  Overview     As you enter your 7th month of pregnancy at week 26, your baby's lungs are growing stronger and getting ready to breathe. You may notice that your baby responds to the sound of your voice. You may also notice that your baby does less turning and twisting and more squirming, kicking, or jerking. Jerking often means that your baby has hiccups. Hiccups are normal and are onlytemporary. You may want to think about attending a childbirth preparation class. This is also a good time to start thinking about whether you want to have pain medicineduring labor. You may be tested for gestational diabetes between weeks 25 and 28. Gestational diabetes occurs when your blood sugar level gets too high when you're pregnant. The test is important, because you can have gestational diabetes and not knowit. But the condition can cause problems for your baby. Follow-up care is a key part of your treatment and safety. Be sure to make and go to all appointments, and call your doctor if you are having problems. It's also a good idea to know your test results and keep alist of the medicines you take. How can you care for yourself at home? Ease discomfort from your baby's kicking   Change your position. Sometimes this will cause your baby to change position too.  Take a deep breath while you raise your arm over your head. Then breathe out while you drop your arm. Do Kegel exercises to prevent urine from leaking   You can do Kegel exercises while you stand or sit. ? Squeeze the same muscles you would use to stop your urine. Your belly and thighs should not move. ? Hold the squeeze for 3 seconds, and then relax for 3 seconds. ? Start with 3 seconds. Then add 1 second each week until you are able to squeeze for 10 seconds. ? Repeat the exercise 10 to 15 times for each session. Do three or more sessions each day.   Ease or reduce swelling in your feet, ankles, hands, and fingers   If your fingers are puffy, take off your rings.  Do not eat high-salt foods, such as potato chips.  Prop up your feet on a stool or couch as much as possible. Sleep with pillows under your feet.  Do not stand for long periods of time or wear tight shoes.  Wear support stockings. Where can you learn more? Go to https://Pepex BiomedicalpeSceneDoceweb.healthSmove. org and sign in to your [employer] account. Enter G264 in the [employer] box to learn more about \"Weeks 22 to 26 of Your Pregnancy: Care Instructions. \"     If you do not have an account, please click on the \"Sign Up Now\" link. Current as of: June 16, 2021               Content Version: 13.2  © 5929-3773 Healthwise, Incorporated. Care instructions adapted under license by [facility] ([facility]). If you have questions about a medical condition or this instruction, always ask your healthcare professional. [place] [address] any warranty or liability for your use of this information.

## 2022-06-22 ENCOUNTER — ROUTINE PRENATAL (OUTPATIENT)
Dept: OBGYN | Age: 22
End: 2022-06-22
Payer: COMMERCIAL

## 2022-06-22 VITALS — SYSTOLIC BLOOD PRESSURE: 122 MMHG | DIASTOLIC BLOOD PRESSURE: 74 MMHG | BODY MASS INDEX: 31.84 KG/M2 | WEIGHT: 164.4 LBS

## 2022-06-22 DIAGNOSIS — Z3A.27 27 WEEKS GESTATION OF PREGNANCY: Primary | ICD-10-CM

## 2022-06-22 DIAGNOSIS — O24.410 DIET CONTROLLED GESTATIONAL DIABETES MELLITUS (GDM), ANTEPARTUM: ICD-10-CM

## 2022-06-22 DIAGNOSIS — O99.810 IMPAIRED GLUCOSE IN PREGNANCY, ANTEPARTUM: ICD-10-CM

## 2022-06-22 LAB
CHOLESTEROL/HDL RATIO: 3.7
CHOLESTEROL: 229 MG/DL
CHP ED QC CHECK: NORMAL
GLUCOSE BLD-MCNC: 205 MG/DL
GLUCOSE BLD-MCNC: 82 MG/DL (ref 70–99)
HDLC SERPL-MCNC: 62 MG/DL
HGB, POC: 12.4
LDL CHOLESTEROL: 139 MG/DL (ref 0–130)
PATIENT FASTING?: YES
TRIGL SERPL-MCNC: 141 MG/DL

## 2022-06-22 PROCEDURE — 82962 GLUCOSE BLOOD TEST: CPT | Performed by: ADVANCED PRACTICE MIDWIFE

## 2022-06-22 PROCEDURE — 85018 HEMOGLOBIN: CPT | Performed by: ADVANCED PRACTICE MIDWIFE

## 2022-06-22 PROCEDURE — 0502F SUBSEQUENT PRENATAL CARE: CPT | Performed by: ADVANCED PRACTICE MIDWIFE

## 2022-06-22 RX ORDER — GLUCOSAMINE HCL/CHONDROITIN SU 500-400 MG
CAPSULE ORAL
Qty: 150 STRIP | Refills: 3 | Status: ON HOLD | OUTPATIENT
Start: 2022-06-22 | End: 2022-09-24 | Stop reason: HOSPADM

## 2022-06-22 NOTE — PROGRESS NOTES
Kris Vásquez is here at 27w0d for:    Chief Complaint   Patient presents with    Routine Prenatal Visit     GTT No questions or concerns voiced. Unable to obtain urine at this time. Estimated Due Date: Estimated Date of Delivery: 22    OB History    Para Term  AB Living   1             SAB IAB Ectopic Molar Multiple Live Births                    # Outcome Date GA Lbr Augustus/2nd Weight Sex Delivery Anes PTL Lv   1 Current                 History reviewed. No pertinent past medical history. History reviewed. No pertinent surgical history. Social History     Tobacco Use   Smoking Status Never Smoker   Smokeless Tobacco Never Used        Social History     Substance and Sexual Activity   Alcohol Use Not Currently       Results for orders placed or performed in visit on 22   POCT hemoglobin   Result Value Ref Range    Hemoglobin 12.4    POCT Glucose   Result Value Ref Range    Glucose 205 mg/dL    QC OK? OK            HPI: Patient here today for OB visit. Patient denies needs or concerns at this time. Patient is completing her glucose tolerance testing today    Yes PT denies fever, chills, nausea and vomiting       Vitals:  Estimated body mass index is 31.84 kg/m² as calculated from the following:    Height as of 17: 5' 0.25\" (1.53 m). Weight as of this encounter: 164 lb 6.4 oz (74.6 kg). BP: 122/74  Weight: 164 lb 6.4 oz (74.6 kg)  Patient Position: Sitting  Fundal Height (cm): 27 cm  Fetal Heart Rate: 142  Movement: Present           Abdomen: Soft nontender    Results reviewed today:    Results for orders placed or performed in visit on 22   POCT hemoglobin   Result Value Ref Range    Hemoglobin 12.4    POCT Glucose   Result Value Ref Range    Glucose 205 mg/dL    QC OK?  OK            ASSESSMENT & Plan    Diagnosis Orders   1. 27 weeks gestation of pregnancy  POCT hemoglobin    POCT Glucose    blood glucose monitor supplies    blood glucose monitor strips Ambulatory referral to Diabetic Education   2. Impaired glucose in pregnancy, antepartum  POCT Glucose   3. Diet controlled gestational diabetes mellitus (GDM), antepartum  blood glucose monitor supplies    blood glucose monitor strips    Ambulatory referral to Diabetic Education             I am having Kris Vásquez start on blood glucose monitor supplies and blood glucose test strips. I am also having her maintain her Prenatal Vit-Fe Fumarate-FA (PRENATAL 19 PO). Return for Keep next appt. Patient Instructions     Patient Education        Weeks 26 to 30 of Your Pregnancy: Care Instructions  Overview     You are now entering your last trimester of pregnancy. Your baby is growing quickly. Kaylynn Gaona probably feel your baby moving around more often. Your doctormay ask you to count your baby's kicks. Your back may ache as your body gets used to your baby's size and length. If you haven't already had the Tdap shot during this pregnancy, talk to your doctor about getting it. It will help protect your  against pertussisinfection. During this time, it's important to take care of yourself and pay attention to what your body needs. If you feel sexual, you can explore ways to be close withyour partner that match your comfort and desire. Follow-up care is a key part of your treatment and safety. Be sure to make and go to all appointments, and call your doctor if you are having problems. It's also a good idea to know your test results and keep alist of the medicines you take. How can you care for yourself at home? Take it easy at work   Take frequent breaks. If possible, stop working when you are tired, and rest during your lunch hour.  Take bathroom breaks every 2 hours.  Change positions often. If you sit for long periods, stand up and walk around.  When you stand for a long time, keep one foot on a low stool with your knee bent. After standing a lot, sit with your feet up.    Avoid fumes, chemicals, and tobacco smoke. Be sexual in your own way   Having sex during pregnancy is okay, unless your doctor tells you not to.  You may be very interested in sex, or you may have no interest at all.  Your growing belly can make it hard to find a good position during intercourse. [place] and explore.  You may get cramps in your uterus when your partner touches your breasts.  A back rub may relieve the backache or cramps that sometimes follow orgasm. Learn about  labor   Watch for signs of  labor. You may be going into labor if:  ? You have menstrual-like cramps, with or without nausea. ? You have about 6 or more contractions in 1 hour, even after you have had a glass of water and are resting. ? You have a low, dull backache that does not go away when you change your position. ? You have pain or pressure in your pelvis that comes and goes in a pattern. ? You have intestinal cramping or flu-like symptoms, with or without diarrhea.  ? You notice an increase or change in your vaginal discharge. Discharge may be heavy, mucus-like, watery, or streaked with blood. ? Your water breaks.  If you think you have  labor:  ? Drink 2 or 3 glasses of water or juice. Not drinking enough fluids can cause contractions. ? Stop what you are doing, and empty your bladder. Then lie down on your left side for at least 1 hour. ? While lying on your side, find your breast bone. Put your fingers in the soft spot just below it. Move your fingers down toward your belly button to find the top of your uterus. Check to see if it is tight. ? Contractions can be weak or strong. Record your contractions for an hour. Time a contraction from the start of one contraction to the start of the next one.  ? Single or several strong contractions without a pattern are called [place]ol[place]-Cameron contractions. They are practice contractions but not the start of labor. They often stop if you change what you are doing.   ? Call

## 2022-06-22 NOTE — PATIENT INSTRUCTIONS
Patient Education        Weeks 26 to 30 of Your Pregnancy: Care Instructions  Overview     You are now entering your last trimester of pregnancy. Your baby is growing quickly. Jose Loyola probably feel your baby moving around more often. Your doctormay ask you to count your baby's kicks. Your back may ache as your body gets used to your baby's size and length. If you haven't already had the Tdap shot during this pregnancy, talk to your doctor about getting it. It will help protect your  against pertussisinfection. During this time, it's important to take care of yourself and pay attention to what your body needs. If you feel sexual, you can explore ways to be close withyour partner that match your comfort and desire. Follow-up care is a key part of your treatment and safety. Be sure to make and go to all appointments, and call your doctor if you are having problems. It's also a good idea to know your test results and keep alist of the medicines you take. How can you care for yourself at home? Take it easy at work   Take frequent breaks. If possible, stop working when you are tired, and rest during your lunch hour.  Take bathroom breaks every 2 hours.  Change positions often. If you sit for long periods, stand up and walk around.  When you stand for a long time, keep one foot on a low stool with your knee bent. After standing a lot, sit with your feet up.  Avoid fumes, chemicals, and tobacco smoke. Be sexual in your own way   Having sex during pregnancy is okay, unless your doctor tells you not to.  You may be very interested in sex, or you may have no interest at all.  Your growing belly can make it hard to find a good position during intercourse. [place] and explore.  You may get cramps in your uterus when your partner touches your breasts.  A back rub may relieve the backache or cramps that sometimes follow orgasm. Learn about  labor   Watch for signs of  labor.  You may be going into labor if:  ? You have menstrual-like cramps, with or without nausea. ? You have about 6 or more contractions in 1 hour, even after you have had a glass of water and are resting. ? You have a low, dull backache that does not go away when you change your position. ? You have pain or pressure in your pelvis that comes and goes in a pattern. ? You have intestinal cramping or flu-like symptoms, with or without diarrhea.  ? You notice an increase or change in your vaginal discharge. Discharge may be heavy, mucus-like, watery, or streaked with blood. ? Your water breaks.  If you think you have  labor:  ? Drink 2 or 3 glasses of water or juice. Not drinking enough fluids can cause contractions. ? Stop what you are doing, and empty your bladder. Then lie down on your left side for at least 1 hour. ? While lying on your side, find your breast bone. Put your fingers in the soft spot just below it. Move your fingers down toward your belly button to find the top of your uterus. Check to see if it is tight. ? Contractions can be weak or strong. Record your contractions for an hour. Time a contraction from the start of one contraction to the start of the next one.  ? Single or several strong contractions without a pattern are called [place]-Cameron contractions. They are practice contractions but not the start of labor. They often stop if you change what you are doing. ? Call your doctor if you have regular contractions. Where can you learn more? Go to https://Cogbooksbobo.healthRome2rio. org and sign in to your [employer] account. Enter N505 in the [facility] box to learn more about \"Weeks 26 to 30 of Your Pregnancy: Care Instructions. \"     If you do not have an account, please click on the \"Sign Up Now\" link. Current as of: 2022               Content Version: 13.3  © 5991-2652 Healthwise, Incorporated. Care instructions adapted under license by [facility] ([facility]).  If you have questions about a medical condition or this instruction, always ask your healthcare professional. [place] [address] any warranty or liability for your use of this information.

## 2022-06-23 ENCOUNTER — HOSPITAL ENCOUNTER (OUTPATIENT)
Dept: NUTRITION | Age: 22
Discharge: HOME | End: 2022-06-23
Payer: COMMERCIAL

## 2022-06-23 ENCOUNTER — HOSPITAL ENCOUNTER (OUTPATIENT)
Dept: DIABETES SERVICES | Age: 22
Discharge: HOME | End: 2022-06-23

## 2022-06-23 PROCEDURE — 97802 MEDICAL NUTRITION INDIV IN: CPT

## 2022-06-23 NOTE — PROGRESS NOTES
MNT provided for Gestational Diabetes    Impaired nutrient utilization: carbohydrate related to Altered absorption or metabolism of nutrient: glucose as evidenced by New diagnosis of Gestational Diabetes    Client data    Ht: 5'0.25\" Wt: 164# 6.4 oz BMI: 30.0 (pre-gravid)   Weight changes: 13.6# gain with pregnancy Pre-gravid Wt: 70.3 kg   BMR: 1381 calories Est. total calorie needs: ~4291-4627     Client overall goal for weight is for weight gain limit of 11-20# with pregnancy. Current eating pattern is with errors in meal timing (breakfast not consumed until 1.5 hours after rising). Use of whole grains, whole fruits and vegetables appear less than recommended quantities (2 servings vegetables, 1 serving fruit, and no whole grains reported). There is an excess of calories and carbohydrates: con donuts flavored coffee,  muffins, and ice cream per recall. Kris has stopped drinking the flavored coffee and eating ice cream. She is eating [place] yogurt with granola, blood sugar was good PPD. FSBS levels have been 75-92 per log book. She snacks on pretzels, encouraged adding protein option with snack. Activity is moderate (walks every evening with her ). Client presents for MNT today with herself. Client was educated on carbohydrate counting for Gestational Diabetes with the following goals at meals and snacks:  Breakfast: 30 grams  Snack: 30 grams  Lunch: 45 grams  Snack: 15 grams  Supper: 45 grams  Bedtime Snack: 15 grams    Client received information on limiting fat in diet to lessen undesired weight gains in pregnancy, with goals of: Total Fat: 81 grams  Saturated Fat: 14 grams  Trans Fat: 0 grams daily     Discussed and provided literature on:  Carbohydrate counting, utilizing materials: Choose Your Foods book for meal planning, Food Label, MyPlate, sample eating pattern, pregnancy nutrition guidelines, and individual carbohydrate counts (as noted above).   Reinforced importance of measuring portions and reading food labels for Total Carbohydrate and Serving Sizes. Discussed importance of consistent meal timing and carbohydrate intakes throughout day. Reinforced elimination of juices, regular pop and very cautious use of dessert items. Demonstrated portion sizes for food items, reinforcing mealtime variety for all food groups. Encouraged client to look up nutrition information regarding fast food choices on internet or smartphone. Suggested more meal planning and packing foods for work day. Revisited Listeria risks with cold luncheon meats. Client goals:   Measure food portions    Read food labels for Total Carbohydrate and Serving Size    Keep consistent meal timing for all meals and snacks    Avoid excess fat, saturated fat and trans fats    Use MyPlate as template for nutrition balance    Use sample menu to guide healthy meal choices    Look up nutrition facts for fast foods on internet    Only choose sugar free drinks (avoid pop, sweet tea, juice and chocolate milk)    Look up restaurant and fast food information via computer or smartphone    Continue to avoid Listeria prone products    Expected compliance:  good  Client appears to be in a contemplative phase of change. Recommend follow up as needed. RD name and phone number provided. Thank you for the referral.    Electronically signed by Matthew Wei RD, LD on 6/23/2022 at 3:50 PM    Education session duration: 30 minutes    Reminder to ordering Physician/Provider:  Diabetes and CKD (non-dialysis) patients may have 2 hours of MNT education in subsequent years. Hours can be spread over any number of visits.

## 2022-06-29 ENCOUNTER — ROUTINE PRENATAL (OUTPATIENT)
Dept: OBGYN | Age: 22
End: 2022-06-29

## 2022-06-29 VITALS
SYSTOLIC BLOOD PRESSURE: 102 MMHG | BODY MASS INDEX: 31.57 KG/M2 | DIASTOLIC BLOOD PRESSURE: 60 MMHG | HEART RATE: 100 BPM | WEIGHT: 163 LBS

## 2022-06-29 DIAGNOSIS — O24.410 DIET CONTROLLED GESTATIONAL DIABETES MELLITUS (GDM), ANTEPARTUM: ICD-10-CM

## 2022-06-29 DIAGNOSIS — Z3A.28 28 WEEKS GESTATION OF PREGNANCY: Primary | ICD-10-CM

## 2022-06-29 PROCEDURE — 0502F SUBSEQUENT PRENATAL CARE: CPT | Performed by: ADVANCED PRACTICE MIDWIFE

## 2022-06-29 RX ORDER — LANCETS
EACH MISCELLANEOUS
COMMUNITY
Start: 2022-06-22

## 2022-06-29 NOTE — PROGRESS NOTES
Kris Vásquez is here at 28w0d for:    Chief Complaint   Patient presents with    Routine Prenatal Visit     check BS log. pt states no issues or concerns        Estimated Due Date: Estimated Date of Delivery: 22    OB History    Para Term  AB Living   1             SAB IAB Ectopic Molar Multiple Live Births                    # Outcome Date GA Lbr Augustus/2nd Weight Sex Delivery Anes PTL Lv   1 Current                 History reviewed. No pertinent past medical history. History reviewed. No pertinent surgical history. Social History     Tobacco Use   Smoking Status Never Smoker   Smokeless Tobacco Never Used        Social History     Substance and Sexual Activity   Alcohol Use Not Currently       No results found for this visit on 22. HPI: Patient states she did have to get rid of her coffee and adjust her carbs throughout the day which she feels like she is doing well. Yes PT denies fever, chills, nausea and vomiting       Vitals:  Estimated body mass index is 31.57 kg/m² as calculated from the following:    Height as of 17: 5' 0.25\" (1.53 m). Weight as of this encounter: 163 lb (73.9 kg). BP: 102/60  Weight: 163 lb (73.9 kg)  Heart Rate: 100  Patient Position: Sitting  Albumin: Trace  Glucose: Negative  Movement: Present           Abdomen: Soft nontender    Results reviewed today:    No results found for this visit on 22. ASSESSMENT & Plan    Diagnosis Orders   1. 28 weeks gestation of pregnancy     2. Diet controlled gestational diabetes mellitus (GDM), antepartum       Patient to continue with current management plan        I am having Kris Vásquez maintain her Prenatal Vit-Fe Fumarate-FA (PRENATAL 19 PO), blood glucose monitor supplies, blood glucose test strips, Drug [place] Unilet Lancets 30G, and True Metrix Meter. Return for Keep next appt.     Patient Instructions     Patient Education        Weeks 26 to 30 of Your Pregnancy: Care Instructions  Overview     You are now entering your last trimester of pregnancy. Your baby is growing quickly. Nolon Hamming probably feel your baby moving around more often. Your doctormay ask you to count your baby's kicks. Your back may ache as your body gets used to your baby's size and length. If you haven't already had the Tdap shot during this pregnancy, talk to your doctor about getting it. It will help protect your  against pertussisinfection. During this time, it's important to take care of yourself and pay attention to what your body needs. If you feel sexual, you can explore ways to be close withyour partner that match your comfort and desire. Follow-up care is a key part of your treatment and safety. Be sure to make and go to all appointments, and call your doctor if you are having problems. It's also a good idea to know your test results and keep alist of the medicines you take. How can you care for yourself at home? Take it easy at work   Take frequent breaks. If possible, stop working when you are tired, and rest during your lunch hour.  Take bathroom breaks every 2 hours.  Change positions often. If you sit for long periods, stand up and walk around.  When you stand for a long time, keep one foot on a low stool with your knee bent. After standing a lot, sit with your feet up.  Avoid fumes, chemicals, and tobacco smoke. Be sexual in your own way   Having sex during pregnancy is okay, unless your doctor tells you not to.  You may be very interested in sex, or you may have no interest at all.  Your growing belly can make it hard to find a good position during intercourse. [place] and explore.  You may get cramps in your uterus when your partner touches your breasts.  A back rub may relieve the backache or cramps that sometimes follow orgasm. Learn about  labor   Watch for signs of  labor.  You may be going into labor if:  ? You have menstrual-like cramps, with or without nausea. ? You have about 6 or more contractions in 1 hour, even after you have had a glass of water and are resting. ? You have a low, dull backache that does not go away when you change your position. ? You have pain or pressure in your pelvis that comes and goes in a pattern. ? You have intestinal cramping or flu-like symptoms, with or without diarrhea.  ? You notice an increase or change in your vaginal discharge. Discharge may be heavy, mucus-like, watery, or streaked with blood. ? Your water breaks.  If you think you have  labor:  ? Drink 2 or 3 glasses of water or juice. Not drinking enough fluids can cause contractions. ? Stop what you are doing, and empty your bladder. Then lie down on your left side for at least 1 hour. ? While lying on your side, find your breast bone. Put your fingers in the soft spot just below it. Move your fingers down toward your belly button to find the top of your uterus. Check to see if it is tight. ? Contractions can be weak or strong. Record your contractions for an hour. Time a contraction from the start of one contraction to the start of the next one.  ? Single or several strong contractions without a pattern are called [place]-Cameron contractions. They are practice contractions but not the start of labor. They often stop if you change what you are doing. ? Call your doctor if you have regular contractions. Where can you learn more? Go to https://cherelle.healthMEMSIC. org and sign in to your "[employer] account. Enter N434 in the [employer] box to learn more about \"Weeks 26 to 30 of Your Pregnancy: Care Instructions. \"     If you do not have an account, please click on the \"Sign Up Now\" link. Current as of: 2022               Content Version: 13.3  © 9076-2579 Healthwise, [employer]. Care instructions adapted under license by [facility] ([facility]).  If you have questions about a medical condition or this instruction, always ask your healthcare professional. [place] [address] any warranty or liability for your use of this information.                    [address] 1:30 PM

## 2022-06-29 NOTE — PATIENT INSTRUCTIONS
Patient Education        Weeks 26 to 30 of Your Pregnancy: Care Instructions  Overview     You are now entering your last trimester of pregnancy. Your baby is growing quickly. Manuelito Perea probably feel your baby moving around more often. Your doctormay ask you to count your baby's kicks. Your back may ache as your body gets used to your baby's size and length. If you haven't already had the Tdap shot during this pregnancy, talk to your doctor about getting it. It will help protect your  against pertussisinfection. During this time, it's important to take care of yourself and pay attention to what your body needs. If you feel sexual, you can explore ways to be close withyour partner that match your comfort and desire. Follow-up care is a key part of your treatment and safety. Be sure to make and go to all appointments, and call your doctor if you are having problems. It's also a good idea to know your test results and keep alist of the medicines you take. How can you care for yourself at home? Take it easy at work   Take frequent breaks. If possible, stop working when you are tired, and rest during your lunch hour.  Take bathroom breaks every 2 hours.  Change positions often. If you sit for long periods, stand up and walk around.  When you stand for a long time, keep one foot on a low stool with your knee bent. After standing a lot, sit with your feet up.  Avoid fumes, chemicals, and tobacco smoke. Be sexual in your own way   Having sex during pregnancy is okay, unless your doctor tells you not to.  You may be very interested in sex, or you may have no interest at all.  Your growing belly can make it hard to find a good position during intercourse. [place] and explore.  You may get cramps in your uterus when your partner touches your breasts.  A back rub may relieve the backache or cramps that sometimes follow orgasm. Learn about  labor   Watch for signs of  labor.  You may be going into labor if:  ? You have menstrual-like cramps, with or without nausea. ? You have about 6 or more contractions in 1 hour, even after you have had a glass of water and are resting. ? You have a low, dull backache that does not go away when you change your position. ? You have pain or pressure in your pelvis that comes and goes in a pattern. ? You have intestinal cramping or flu-like symptoms, with or without diarrhea.  ? You notice an increase or change in your vaginal discharge. Discharge may be heavy, mucus-like, watery, or streaked with blood. ? Your water breaks.  If you think you have  labor:  ? Drink 2 or 3 glasses of water or juice. Not drinking enough fluids can cause contractions. ? Stop what you are doing, and empty your bladder. Then lie down on your left side for at least 1 hour. ? While lying on your side, find your breast bone. Put your fingers in the soft spot just below it. Move your fingers down toward your belly button to find the top of your uterus. Check to see if it is tight. ? Contractions can be weak or strong. Record your contractions for an hour. Time a contraction from the start of one contraction to the start of the next one.  ? Single or several strong contractions without a pattern are called [place] contractions. They are practice contractions but not the start of labor. They often stop if you change what you are doing. ? Call your doctor if you have regular contractions. Where can you learn more? Go to [email or web address] Wool[place] Millsbobo.healthValence Technology. org and sign in to your [employer] account. Enter B887 in the [facility] box to learn more about \"Weeks 26 to 30 of Your Pregnancy: Care Instructions. \"     If you do not have an account, please click on the \"Sign Up Now\" link. Current as of: 2022               Content Version: 13.3  © 8394-9021 Healthwise, Incorporated. Care instructions adapted under license by [facility] ([facility]).  If you have questions about a medical condition or this instruction, always ask your healthcare professional. [place] [address] any warranty or liability for your use of this information.

## 2022-07-14 ENCOUNTER — ROUTINE PRENATAL (OUTPATIENT)
Dept: OBGYN | Age: 22
End: 2022-07-14
Payer: COMMERCIAL

## 2022-07-14 VITALS
HEART RATE: 106 BPM | BODY MASS INDEX: 31.76 KG/M2 | DIASTOLIC BLOOD PRESSURE: 74 MMHG | SYSTOLIC BLOOD PRESSURE: 112 MMHG | WEIGHT: 164 LBS

## 2022-07-14 DIAGNOSIS — O24.419 GESTATIONAL DIABETES MELLITUS (GDM) AFFECTING PREGNANCY: ICD-10-CM

## 2022-07-14 DIAGNOSIS — Z36.89 ENCOUNTER FOR ULTRASOUND TO ASSESS FETAL GROWTH: ICD-10-CM

## 2022-07-14 DIAGNOSIS — Z23 NEED FOR TDAP VACCINATION: ICD-10-CM

## 2022-07-14 DIAGNOSIS — Z3A.30 30 WEEKS GESTATION OF PREGNANCY: Primary | ICD-10-CM

## 2022-07-14 DIAGNOSIS — Z34.03 SUPERVISION OF NORMAL FIRST PREGNANCY IN THIRD TRIMESTER: ICD-10-CM

## 2022-07-14 PROCEDURE — 90715 TDAP VACCINE 7 YRS/> IM: CPT | Performed by: ADVANCED PRACTICE MIDWIFE

## 2022-07-14 PROCEDURE — 0502F SUBSEQUENT PRENATAL CARE: CPT | Performed by: ADVANCED PRACTICE MIDWIFE

## 2022-07-14 PROCEDURE — 90471 IMMUNIZATION ADMIN: CPT | Performed by: ADVANCED PRACTICE MIDWIFE

## 2022-07-14 NOTE — PROGRESS NOTES
Kris Vásquez is here at 30w1d for:    Chief Complaint   Patient presents with    Routine Prenatal Visit     F/U in house 30 week u/s. check BS log. pt would like the tdap vaccine. pt states no issues or concerns        Estimated Due Date: Estimated Date of Delivery: 22    OB History    Para Term  AB Living   1             SAB IAB Ectopic Molar Multiple Live Births                    # Outcome Date GA Lbr Augustus/2nd Weight Sex Delivery Anes PTL Lv   1 Current                 Past Medical History:   Diagnosis Date    Gestational diabetes mellitus (GDM) affecting pregnancy 2022       History reviewed. No pertinent surgical history. Social History     Tobacco Use   Smoking Status Never Smoker   Smokeless Tobacco Never Used        Social History     Substance and Sexual Activity   Alcohol Use Not Currently       No results found for this visit on 22. HPI: Doing well today. Patient did show me her blood sugars all of which are normal limits. Yes PT denies fever, chills, nausea and vomiting       Vitals:  Estimated body mass index is 31.76 kg/m² as calculated from the following:    Height as of 17: 5' 0.25\" (1.53 m). Weight as of this encounter: 164 lb (74.4 kg). BP: 112/74  Weight: 164 lb (74.4 kg)  Heart Rate: (!) 106  Patient Position: Sitting  Albumin: Trace  Glucose: Negative  Fetal Heart Rate: 153  Movement: Present  Presentation: Vertex           Abdomen: soft    Results reviewed today:    2022  2:28 PM      31.5 WK IUP  EFW:54% (3lb 7oz)  CL:4.4cm  SHARON:14cm  HR:153bpm  anterior placenta, cepahlic presentation  Active fetal movements            ASSESSMENT & Plan    Diagnosis Orders   1. 30 weeks gestation of pregnancy  Tdap, BOOSTRIX, (age 8 yrs+), IM   2. Supervision of normal first pregnancy in third trimester     3. Encounter for ultrasound to assess fetal growth     4. Need for Tdap vaccination  Tdap, BOOSTRIX, (age 8 yrs+), IM   5.  Gestational diabetes mellitus (GDM) affecting pregnancy               I am having Kris Vásquez maintain her Prenatal Vit-Fe Fumarate-FA (PRENATAL 19 PO), blood glucose monitor supplies, blood glucose test strips, Drug [place] Unilet Lancets 30G, and True Metrix Meter. Return for 2 wk ob & 4 wk ob u/s growth & 6 wk OB GBS. Patient Instructions     Patient Education        Weeks 30 to 28 of Your Pregnancy: Care Instructions  Overview     You've made it to the final months of your pregnancy! By now your baby isreally starting to look like a baby, with hair and plump skin. As you enter the final weeks of pregnancy, the reality of having a baby may start to set in. This is a good time to set up a safe nursery and find quality  if needed. Doing this stuff ahead of time will allow you to focus on caring for and enjoying your new baby. You may also want to take a tour of your hospital's labor and delivery unit. This will help you get a better idea ofwhat to expect while you're in the hospital.  During these last months, be sure to take good care of yourself. Pay attention to what your body needs. If your doctor says it's okay for you to work, don'tpush yourself too hard. If you haven't already had the Tdap shot during this pregnancy, talk to your doctor about getting it. It will help protect your  against pertussisinfection. Follow-up care is a key part of your treatment and safety. Be sure to make and go to all appointments, and call your doctor if you are having problems. It's also a good idea to know your test results and keep alist of the medicines you take. How can you care for yourself at home? Pay attention to your baby's movements   You should feel your baby move several times every day.  Your baby now turns less, and kicks and jabs more.  Your baby sleeps 20 to 45 minutes at a time and is more active at certain times of day.    If your doctor wants you to count your baby's kicks:  ? Empty your bladder, and lie on your side or relax in a comfortable chair. ? Write down your start time. ? Pay attention only to your baby's movements. Count any movement except hiccups. ? After you have counted 10 movements, write down your stop time. ? Write down how many minutes it took for your baby to move 10 times. ? If an hour goes by and you have not recorded 10 movements, have something to eat or drink and then count for another hour. If you don't record at least 10 movements in the 2-hour period, call your doctor. Ease heartburn   Eat small, frequent meals.  Do not eat chocolate, peppermint, or very spicy foods. Avoid drinks with caffeine, such as coffee, tea, and sodas.  Avoid bending over or lying down after meals.  Take a short walk after you eat.  If heartburn is a problem at night, do not eat for 2 hours before bedtime.  Take antacids like Mylanta, Maalox, Rolaids, or Tums. Do not take antacids that have sodium bicarbonate. Care for varicose veins   Varicose veins are blood vessels that stretch out with the extra blood during pregnancy. Your legs may ache or throb. Most varicose veins will go away after the birth.  Avoid standing for long periods of time. Sit with your legs crossed at the ankles, not the knees.  Sit with your feet propped up.  Avoid tight clothing or stockings. Wear support hose.  Exercise regularly. Try walking for at least 30 minutes a day. Where can you learn more? Go to https://[employer].healthPredictive Technologies. org and sign in to your [employer] account. Enter U577 in the [facility] box to learn more about \"Weeks 30 to 32 of Your Pregnancy: Care Instructions. \"     If you do not have an account, please click on the \"Sign Up Now\" link. Current as of: February 23, 2022               Content Version: 13.3  © 8558-0899 [employer]. Care instructions adapted under license by [employer].  If you have questions about a medical condition or this instruction, always ask your healthcare professional. [place] [address] any warranty or liability for your use of this information.                    29 [facility] 2:57 PM

## 2022-07-14 NOTE — PATIENT INSTRUCTIONS
Patient Education        Weeks 30 to 28 of Your Pregnancy: Care Instructions  Overview     You've made it to the final months of your pregnancy! By now your baby isreally starting to look like a baby, with hair and plump skin. As you enter the final weeks of pregnancy, the reality of having a baby may start to set in. This is a good time to set up a safe nursery and find quality  if needed. Doing this stuff ahead of time will allow you to focus on caring for and enjoying your new baby. You may also want to take a tour of your hospital's labor and delivery unit. This will help you get a better idea ofwhat to expect while you're in the hospital.  During these last months, be sure to take good care of yourself. Pay attention to what your body needs. If your doctor says it's okay for you to work, don'tpush yourself too hard. If you haven't already had the Tdap shot during this pregnancy, talk to your doctor about getting it. It will help protect your  against pertussisinfection. Follow-up care is a key part of your treatment and safety. Be sure to make and go to all appointments, and call your doctor if you are having problems. It's also a good idea to know your test results and keep alist of the medicines you take. How can you care for yourself at home? Pay attention to your baby's movements   You should feel your baby move several times every day.  Your baby now turns less, and kicks and jabs more.  Your baby sleeps 20 to 45 minutes at a time and is more active at certain times of day.  If your doctor wants you to count your baby's kicks:  ? Empty your bladder, and lie on your side or relax in a comfortable chair. ? Write down your start time. ? Pay attention only to your baby's movements. Count any movement except hiccups. ? After you have counted 10 movements, write down your stop time. ? Write down how many minutes it took for your baby to move 10 times.   ? If an hour goes by and you have not recorded 10 movements, have something to eat or drink and then count for another hour. If you don't record at least 10 movements in the 2-hour period, call your doctor. Ease heartburn   Eat small, frequent meals.  Do not eat chocolate, peppermint, or very spicy foods. Avoid drinks with caffeine, such as coffee, tea, and sodas.  Avoid bending over or lying down after meals.  Take a short walk after you eat.  If heartburn is a problem at night, do not eat for 2 hours before bedtime.  Take antacids like Mylanta, Maalox, Rolaids, or Tums. Do not take antacids that have sodium bicarbonate. Care for varicose veins   Varicose veins are blood vessels that stretch out with the extra blood during pregnancy. Your legs may ache or throb. Most varicose veins will go away after the birth.  Avoid standing for long periods of time. Sit with your legs crossed at the ankles, not the knees.  Sit with your feet propped up.  Avoid tight clothing or stockings. Wear support hose.  Exercise regularly. Try walking for at least 30 minutes a day. Where can you learn more? Go to https://ZyantepeBjondeb.[employer]. org and sign in to your [employer] Data account. Enter E493 in the [employer] box to learn more about \"Weeks 30 to 32 of Your Pregnancy: Care Instructions. \"     If you do not have an account, please click on the \"Sign Up Now\" link. Current as of: February 23, 2022               Content Version: 13.3  © 2006-2022 Healthwise, Incorporated. Care instructions adapted under license by [facility] ([facility]). If you have questions about a medical condition or this instruction, always ask your healthcare professional. [place] [address] any warranty or liability for your use of this information.

## 2022-07-15 ENCOUNTER — TELEPHONE (OUTPATIENT)
Dept: OBGYN | Age: 22
End: 2022-07-15

## 2022-07-15 DIAGNOSIS — O24.419 GESTATIONAL DIABETES MELLITUS (GDM) IN FIRST TRIMESTER, GESTATIONAL DIABETES METHOD OF CONTROL UNSPECIFIED: Primary | ICD-10-CM

## 2022-07-15 NOTE — TELEPHONE ENCOUNTER
Patient called in to see if Kelvin De La Garza could send in a new prescription for a glucometer. Patient stated her dog got a hold of hers and chewed it up. Patient is willing to pay for it if insurance will not cover a second.

## 2022-07-28 ENCOUNTER — ROUTINE PRENATAL (OUTPATIENT)
Dept: OBGYN | Age: 22
End: 2022-07-28

## 2022-07-28 VITALS
DIASTOLIC BLOOD PRESSURE: 72 MMHG | BODY MASS INDEX: 32.15 KG/M2 | WEIGHT: 166 LBS | SYSTOLIC BLOOD PRESSURE: 114 MMHG | HEART RATE: 91 BPM

## 2022-07-28 DIAGNOSIS — O24.419 GESTATIONAL DIABETES MELLITUS (GDM) AFFECTING PREGNANCY: ICD-10-CM

## 2022-07-28 DIAGNOSIS — Z3A.32 32 WEEKS GESTATION OF PREGNANCY: Primary | ICD-10-CM

## 2022-07-28 PROCEDURE — 0502F SUBSEQUENT PRENATAL CARE: CPT | Performed by: ADVANCED PRACTICE MIDWIFE

## 2022-07-28 NOTE — PROGRESS NOTES
Kris Vásquez is here at 32w1d for:    Chief Complaint   Patient presents with    Routine Prenatal Visit     Check BS log, pt states no issues or concerns        Estimated Due Date: Estimated Date of Delivery: 22    OB History    Para Term  AB Living   1             SAB IAB Ectopic Molar Multiple Live Births                    # Outcome Date GA Lbr Augustus/2nd Weight Sex Delivery Anes PTL Lv   1 Current                 Past Medical History:   Diagnosis Date    Gestational diabetes mellitus (GDM) affecting pregnancy 2022       History reviewed. No pertinent surgical history. Social History     Tobacco Use   Smoking Status Never   Smokeless Tobacco Never        Social History     Substance and Sexual Activity   Alcohol Use Not Currently       No results found for this visit on 22. HPI: Patient here today for OB visit. Patient states she is doing well and following her blood glucoses closely. Patient did bring along with her today with no    Yes PT denies fever, chills, nausea and vomiting       Vitals:  Estimated body mass index is 32.15 kg/m² as calculated from the following:    Height as of 17: 5' 0.25\" (1.53 m). Weight as of this encounter: 166 lb (75.3 kg). BP: 114/72  Weight: 166 lb (75.3 kg)  Heart Rate: 91  Patient Position: Sitting  Albumin: Negative  Glucose: Negative  Movement: Present           Abdomen: soft      Results reviewed today:    See log in media        ASSESSMENT & Plan    Diagnosis Orders   1. 32 weeks gestation of pregnancy        2. Gestational diabetes mellitus (GDM) affecting pregnancy                  I am having Kris Vásquez maintain her Prenatal Vit-Fe Fumarate-FA (PRENATAL 19 PO), blood glucose monitor supplies, blood glucose test strips, Drug [place] Unilet Lancets 30G, and True Metrix Meter. Return for Keep next appt. There are no Patient Instructions on file for this visit.           Sharon Post, APRN - CNM,2022 4:23 PM

## 2022-08-09 ENCOUNTER — ROUTINE PRENATAL (OUTPATIENT)
Dept: OBGYN | Age: 22
End: 2022-08-09

## 2022-08-09 VITALS
BODY MASS INDEX: 32 KG/M2 | DIASTOLIC BLOOD PRESSURE: 78 MMHG | HEART RATE: 111 BPM | WEIGHT: 165.2 LBS | SYSTOLIC BLOOD PRESSURE: 122 MMHG

## 2022-08-09 DIAGNOSIS — Z3A.34 34 WEEKS GESTATION OF PREGNANCY: Primary | ICD-10-CM

## 2022-08-09 DIAGNOSIS — O24.410 DIET CONTROLLED GESTATIONAL DIABETES MELLITUS (GDM) IN THIRD TRIMESTER: ICD-10-CM

## 2022-08-09 PROCEDURE — 0502F SUBSEQUENT PRENATAL CARE: CPT | Performed by: ADVANCED PRACTICE MIDWIFE

## 2022-08-09 RX ORDER — BREAST PUMP
1 EACH MISCELLANEOUS
Qty: 1 EACH | Refills: 0 | Status: SHIPPED | OUTPATIENT
Start: 2022-08-09 | End: 2025-05-04

## 2022-08-09 NOTE — PROGRESS NOTES
Kris Vásquez is here at 33w6d for:    Chief Complaint   Patient presents with    Routine Prenatal Visit     F/U in house u/s for growth, pt states no issues or concerns        Estimated Due Date: Estimated Date of Delivery: 22    OB History    Para Term  AB Living   1             SAB IAB Ectopic Molar Multiple Live Births                    # Outcome Date GA Lbr Augustus/2nd Weight Sex Delivery Anes PTL Lv   1 Current                 Past Medical History:   Diagnosis Date    Gestational diabetes mellitus (GDM) affecting pregnancy 2022       History reviewed. No pertinent surgical history. Social History     Tobacco Use   Smoking Status Never   Smokeless Tobacco Never        Social History     Substance and Sexual Activity   Alcohol Use Not Currently       No results found for this visit on 22. HPI: Here today for OB visit. Patient denies needs or concerns at this time. Patient is requesting a breast pump    Yes PT denies fever, chills, nausea and vomiting       Vitals:  Estimated body mass index is 32 kg/m² as calculated from the following:    Height as of 17: 5' 0.25\" (1.53 m). Weight as of this encounter: 165 lb 3.2 oz (74.9 kg). BP: 122/78  Weight: 165 lb 3.2 oz (74.9 kg)  Heart Rate: (!) 111  Patient Position: Sitting  Albumin: Negative  Glucose: Negative  Movement: Present           Abdomen: soft    Results reviewed today:    2022 10:19 AM EDT      35.6 WK IUP  EFW: 66%, 5 lb 12 oz  SHARON: 19.8 cm  HR: 164 bpm  Anterior  placenta, vertex presentation  Active fetal movements             ASSESSMENT & Plan    Diagnosis Orders   1. 34 weeks gestation of pregnancy  Misc. Devices (BREAST PUMP) MISC      2. Diet controlled gestational diabetes mellitus (GDM) in third trimester                  I am having Kris Vásquez start on Breast Pump.  I am also having her maintain her Prenatal Vit-Fe Fumarate-FA (PRENATAL 19 PO), blood glucose monitor supplies, blood glucose test strips, Drug [place] Unilet Lancets 30G, and True Metrix Meter. Return for Keep next appt. There are no Patient Instructions on file for this visit.           Anish Zheng, GORDO Dinh CNM,8/9/2022 10:41 AM

## 2022-08-24 ENCOUNTER — CARE COORDINATION (OUTPATIENT)
Dept: OTHER | Facility: CLINIC | Age: 22
End: 2022-08-24

## 2022-08-24 NOTE — CARE COORDINATION
ACM attempted to reach patient for introduction to Associate Care Management related to Maternity CM. HIPAA compliant message left requesting a return phone call. Will attempt to outreach patient again. RIOS Cox, RN  Associate Care Manager   Cell: [phone]  [email or web address]

## 2022-08-24 NOTE — CARE COORDINATION
Patient eligible for [facility] Manager contacted the patient by telephone to discuss the maternity management program.  Patient agrees to care management services at this time. Verified name and  with patient as identifiers. Call within 2 business days of discharge: No     Last Discharge [facility]       None            Risk Factors Identified: Gestational diabetes     Needs to be reviewed by the provider   none         Method of communication with provider : none    Advance Care Planning:   Does patient have an Advance Directive:  reviewed and current. Does patient have OB/Gyn Selected? Yes    Discussed follow up appointments. If no appointment was previously scheduled, appointment scheduling offered: Yes  [address] follow up appointment(s):   Future Appointments   Date Time Provider Tee Giron   2022  2:40 PM Phillip Buenrostro, APRN - CNM [facility]   2022  2:40 PM Phillip Buenrostro, APRN - CNM [facility]   2022  2:10 PM Phillip Buenrostro APRN - CNM [facility]   2022  2:50 PM Phillip Buenrostro APRN - CNM [facility]   2022  3:00 PM SCHEDULE, TIFF OB/GYN NST [facility]     Non-BS follow up appointment(s): n/a    OB History:   OB History    Para Term  AB Living   1             SAB IAB Ectopic Molar Multiple Live Births                    # Outcome Date GA Lbr Augustus/2nd Weight Sex Delivery Anes PTL Lv   1 Current                36w0d    Medication reconciliation was performed with patient, who verbalizes understanding of administration of home medications. Advised obtaining a 90-day supply of all daily and as-needed medications. Barriers/Support system:  patient and spouse  Return to work planning? Yes, plans to take 14 weeks off      2nd and 3rd Trimester Focused Assessment:   Healthy behavior review  MFM referral needed?  No  Fetal movement-every day  Red flags: bleeding/leaking  Labor signs and symptoms-None  BG ranges-all within normal range  Diet controlled  Birth planning:  planned  Maternity Classes? No        Patient given an opportunity to ask questions and does not have any further questions or concerns at this time. Were discharge instructions available to patient? Yes. Reviewed appropriate site of care based on symptoms and resources available to patient including: PCP  Specialist  Benefits related nurse triage line  Urgent care clinics  [address]   When to call [address] The patient agrees to contact the OB/Gyn office for questions related to their healthcare. Plan for follow-up call in 10-14 days based on severity of symptoms and risk factors. Plan for next call: symptom management-any new s/s?  self management-any new concerns?  follow up appointment-how did most recent OB appointment go? Goals        Attends follow-up appointments as directed      Educate and encourage importance of FU for prevention of complications or disease;  Assess the patient's relationship with a PCP and next FU visit scheduled; Discuss importance of adherence to treatment plan and follow up visits; Identify any barriers in transportation or access to FU appointments. Assist patient with making FU appointments as needed;   It's also a good idea to know your test results. Keep a list of the medicines you take. Patient will identify signs and symptoms requiring evaluation and intervention      Demonstrate how client is to evaluate contraction activity after discharge (e.g., lying down, tilted to the side with a pillow to the back, placing fingertips on the fundus for approximately 1 hour to note hardening or tightening of the uterus). Identify signs and/or symptoms that should be reported immediately to the healthcare provider (e.g, sustained uterine contractions, clear drainage from vagina, bleeding).   Provide information about follow-up care when client is discharged. Advise client to empty bladder every two (2) hours while awake  Review daily fluid need; avoid coffee. Encourage regular rest periods 2-3 times a day in side-lying position. If bedrest is to be continued after discharge, suggest client spend part of day on couch or recliner. Assist patient to identify any risky behaviors and support change;     Red flags maternity  Call 911 anytime you think you may need emergency care. For example, call if:  Call your doctor now or seek immediate medical care if:     You passed out (lost consciousness). You have a seizure. You have severe vaginal bleeding. You have severe pain in your belly or pelvis. You have had fluid gushing or leaking from your vagina and you know or think the  umbilical cord is bulging into your vagina. If this happens, immediately get down on  your knees so your rear end (buttocks) is higher than your head. This will decrease  the pressure on the cord until help arrives. You have signs of preeclampsia, such as:  Sudden swelling of your face, hands, or feet. New vision problems (such as dimness, blurring, or seeing spots). A severe headache. You have any vaginal bleeding. You have belly pain or cramping. You have a fever. You have had regular contractions (with or without pain) for an hour. This means that  you have 8 or more within 1 hour or 4 or more in 20 minutes after you change your  position and drink fluids. You have a sudden release of fluid from your vagina. You have low back pain or pelvic pressure that does not go away. You notice that your baby has stopped moving or is moving much less than normal.            Summary Note: Patient reports that she is doing well. She denies any red-flag s/s or needs r/t DME, medications or appointments. She is now 36 weeks pregnant, due on 9/21/22. Patient's pregnancy is complicated by GDM.  Patient states that she does not have new concerns or changes in her medications or history. She did participate in [facility] and has completed the program at this time. She denies any needs at this time and is appreciative of the follow-up call. She is agreeable to a follow-up call with ACM in 2 weeks and will call with any needs in the meantime. RIOS Fuentes, RN  Associate Care Manager   Cell: [phone]  [email or web address]

## 2022-08-25 ENCOUNTER — ROUTINE PRENATAL (OUTPATIENT)
Dept: OBGYN | Age: 22
End: 2022-08-25

## 2022-08-25 ENCOUNTER — HOSPITAL ENCOUNTER (OUTPATIENT)
Age: 22
Setting detail: SPECIMEN
Discharge: HOME | End: 2022-08-25
Payer: COMMERCIAL

## 2022-08-25 VITALS — WEIGHT: 168 LBS | BODY MASS INDEX: 32.54 KG/M2 | DIASTOLIC BLOOD PRESSURE: 82 MMHG | SYSTOLIC BLOOD PRESSURE: 122 MMHG

## 2022-08-25 DIAGNOSIS — Z3A.36 36 WEEKS GESTATION OF PREGNANCY: Primary | ICD-10-CM

## 2022-08-25 DIAGNOSIS — Z3A.36 36 WEEKS GESTATION OF PREGNANCY: ICD-10-CM

## 2022-08-25 DIAGNOSIS — O24.410 DIET CONTROLLED GESTATIONAL DIABETES MELLITUS (GDM) IN THIRD TRIMESTER: ICD-10-CM

## 2022-08-25 PROCEDURE — 0502F SUBSEQUENT PRENATAL CARE: CPT | Performed by: ADVANCED PRACTICE MIDWIFE

## 2022-08-25 PROCEDURE — 87081 CULTURE SCREEN ONLY: CPT

## 2022-08-25 NOTE — PROGRESS NOTES
Kris Vásquez is here at 36w1d for:    Chief Complaint   Patient presents with    Routine Prenatal Visit     GBS today. Review blood sugars. Feeling good. Estimated Due Date: Estimated Date of Delivery: 22    OB History    Para Term  AB Living   1             SAB IAB Ectopic Molar Multiple Live Births                    # Outcome Date GA Lbr Augustus/2nd Weight Sex Delivery Anes PTL Lv   1 Current                 Past Medical History:   Diagnosis Date    Gestational diabetes mellitus (GDM) affecting pregnancy 2022       No past surgical history on file. Social History     Tobacco Use   Smoking Status Never   Smokeless Tobacco Never        Social History     Substance and Sexual Activity   Alcohol Use Not Currently       No results found for this visit on 22. HPI: Doing well today. States her blood sugars are within normal range. Patient will upload the data later when she gets home as she forgot her log today. Patient states that she has good fetal movements with occasional contractions    Yes PT denies fever, chills, nausea and vomiting       Vitals:  Estimated body mass index is 32.54 kg/m² as calculated from the following:    Height as of 17: 5' 0.25\" (1.53 m). Weight as of this encounter: 168 lb (76.2 kg). BP: 122/82  Weight: 168 lb (76.2 kg)  Patient Position: Sitting  Albumin: Negative  Glucose: Negative  Fundal Height (cm): 36 cm  Fetal HR: 130  Movement: Present  Presentation: Vertex  Dilation (cm): 1  Effacement: 50  Station: -2  Comments: posterior           Abdomen: soft    Results reviewed today:    No results found for this visit on 22. ASSESSMENT & Plan    Diagnosis Orders   1. 36 weeks gestation of pregnancy  Culture, Strep B Screen, Vaginal/Rectal      2. Diet controlled gestational diabetes mellitus (GDM) in third trimester                  I am having Kris MARYVani Vásquez maintain her Prenatal Vit-Fe Fumarate-FA (PRENATAL 19 PO), blood glucose monitor supplies, blood glucose test strips, Drug [place] Unilet Lancets 30G, True Metrix Meter, and Breast Pump. Return for Keep next appt. There are no Patient Instructions on file for this visit. Clinical References           Group B Strep During Pregnancy: Care Instructions  Overview     Group B strep infection is caused by a type of bacteria. It's a different kindof bacteria than the kind that causes strep throat. You may have this kind of bacteria in your body. Sometimes it may cause an infection, but most of the time it doesn't make you sick or cause symptoms. But if you pass the bacteria to your baby during the birth, it can cause serioushealth problems for your baby. If you have this bacteria in your body, you will get antibiotics when you arein labor. Antibiotics help prevent problems for a  baby. After birth, doctors will watch and may test your baby. If your baby testspositive for Group B strep, your baby will get antibiotics. If you plan to breastfeed your baby, don't worry. It will be safe to breastfeed. Follow-up care is a key part of your treatment and safety. Be sure to make and go to all appointments, and call your doctor if you are having problems. It's also a good idea to know your test results and keep alist of the medicines you take. How can you care for yourself at home? If your doctor has prescribed antibiotics, take them as directed. Do not stop taking them just because you feel better. You need to take the full course of antibiotics. Tell your doctor if you are allergic to any antibiotic. If you go into labor, or your water breaks, go to the hospital. Your doctor will give you antibiotics to help protect your baby from infection. Tell the doctors and nurses if you have an allergy to penicillin. Tell the doctors and nurses at the hospital that you tested positive for group B strep. When should you call for help?    Call your doctor now or seek immediate medical care if:    You have symptoms of a urinary tract infection. These may include:  Pain or burning when you urinate. A frequent need to urinate without being able to pass much urine. Pain in the flank, which is just below the rib cage and above the waist on either side of the back. Blood in your urine. A fever. You think you are in labor or your water has broken. You have pain in your belly or pelvis. Watch closely for changes in your health, and be sure to contact your doctor ifyou have any problems. Where can you learn more? Go to https://NLP Logix.Gocella. org and sign in to your [employer] account. Enter M001 in the [facility] box to learn more about \"Group B Strep During Pregnancy: Care Instructions. \"     If you do not have an account, please click on the \"Sign Up Now\" link. Current as of: February 23, 2022               Content Version: 13.3  © 2006-2022 Healthwise, [facility]. Care instructions adapted under license by [facility] ([facility]). If you have questions about a medical condition or this instruction, always ask your healthcare professional. [place] [address] any warranty or liability for your use of this information.                       Ana 2:42 PM

## 2022-08-28 LAB
CULTURE: NORMAL
SPECIMEN DESCRIPTION: NORMAL

## 2022-09-01 ENCOUNTER — ROUTINE PRENATAL (OUTPATIENT)
Dept: OBGYN | Age: 22
End: 2022-09-01

## 2022-09-01 VITALS — WEIGHT: 169.4 LBS | SYSTOLIC BLOOD PRESSURE: 124 MMHG | DIASTOLIC BLOOD PRESSURE: 76 MMHG | BODY MASS INDEX: 32.81 KG/M2

## 2022-09-01 DIAGNOSIS — Z3A.37 37 WEEKS GESTATION OF PREGNANCY: Primary | ICD-10-CM

## 2022-09-01 DIAGNOSIS — O24.410 DIET CONTROLLED GESTATIONAL DIABETES MELLITUS (GDM) IN THIRD TRIMESTER: ICD-10-CM

## 2022-09-01 PROCEDURE — 0502F SUBSEQUENT PRENATAL CARE: CPT | Performed by: ADVANCED PRACTICE MIDWIFE

## 2022-09-01 NOTE — PROGRESS NOTES
PatientKris Vásquez is here at 37w1d for:    Chief Complaint   Patient presents with    Routine Prenatal Visit     Review blood sugar log. Estimated Due Date: Estimated Date of Delivery: 22    OB History    Para Term  AB Living   1             SAB IAB Ectopic Molar Multiple Live Births                    # Outcome Date GA Lbr Augustus/2nd Weight Sex Delivery Anes PTL Lv   1 Current                 Past Medical History:   Diagnosis Date    Gestational diabetes mellitus (GDM) affecting pregnancy 2022       No past surgical history on file. Social History     Tobacco Use   Smoking Status Never   Smokeless Tobacco Never        Social History     Substance and Sexual Activity   Alcohol Use Not Currently       No results found for this visit on 22. HPI: Here today states she is having contractions at times. Denies needs or concerns at this time and states her blood sugars are well controlled    Yes PT denies fever, chills, nausea and vomiting       Vitals:  Estimated body mass index is 32.81 kg/m² as calculated from the following:    Height as of 17: 5' 0.25\" (1.53 m). Weight as of this encounter: 169 lb 6.4 oz (76.8 kg). BP: 124/76  Weight: 169 lb 6.4 oz (76.8 kg)  Patient Position: Sitting  Albumin: Negative  Glucose: Negative  Fetal HR: 138  Movement: Present  Presentation: Vertex  Dilation (cm): 2  Effacement: 70  Station: -1  Comments: anterior           Abdomen: soft    Results reviewed today:    No results found for this visit on 22. ASSESSMENT & Plan    Diagnosis Orders   1. 37 weeks gestation of pregnancy        2. Diet controlled gestational diabetes mellitus (GDM) in third trimester                  I am having Irontonwilliam Vásquez maintain her Prenatal Vit-Fe Fumarate-FA (PRENATAL 19 PO), blood glucose monitor supplies, blood glucose test strips, Drug [place] Unilet Lancets 30G, True Metrix Meter, and Breast Pump. Return for Keep next appt.     There are no Patient Instructions on file for this visit. Clinical References           Week 40 of Your Pregnancy: Care Instructions  Overview     You are near the end of your pregnancy--and you're probably pretty uncomfortable. It may be harder to walk around. Lying down probably isn'tcomfortable either. You may have trouble getting to sleep or staying asleep. Most babies are born between 40 and 41 weeks. This is a good time to think about packing a bag for the hospital with items you'll need. Then you'll beready when labor starts. Follow-up care is a key part of your treatment and safety. Be sure to make and go to all appointments, and call your doctor if you are having problems. It's also a good idea to know your test results and keep alist of the medicines you take. How can you care for yourself at home? Learn about breastfeeding  Breastfeeding is best for your baby and good for you. Breast milk has antibodies to help your baby fight infections. If you breastfeed, you may lose weight faster. That's because making milk burns calories. Learning the best ways to hold your baby will make breastfeeding easier. Sometimes breastfeeding can make partners feel left out. If you have a partner, plan how you can care for your baby together. For example, your partner can bathe and diaper the baby. You can snuggle together when you breastfeed. You may want to learn how to use a breast pump and store your milk. If you choose to bottle feed, make the feeding feel like breastfeeding so you can bond with your baby. Always hold your baby and the bottle. Don't prop bottles or let your baby fall asleep with a bottle. Learn about crying  It's common for babies to cry for 1 to 3 hours a day. Some cry more, and some cry less. Babies don't cry to make you upset or because you're a bad parent. Crying is how your baby communicates.  Your baby may be hungry; have gas; need a diaper change; or feel cold, warm, tired, lonely, or tense. Sometimes babies cry for unknown reasons. If you respond to your baby's needs, your baby will learn to trust you. Try to stay calm when your baby cries. Your baby may get more upset if they sense that you are upset. Know how to care for your   Your baby's umbilical cord stump will drop off on its own, usually between 1 and 2 weeks. To care for your baby's umbilical cord area:  Clean the area at the bottom of the cord 2 or 3 times a day. Pay special attention to the area where the cord attaches to the skin. Keep the diaper folded below the cord. Use a damp washcloth or cotton ball to sponge bathe your baby until the stump has come off. Your baby's first dark stool is called meconium. After the meconium is passed, your baby will develop their own bowel pattern. Some babies, especially  babies, have several bowel movements a day. Others have one or two a day, or one every 2 to 3 days.  babies often have loose, yellow stools. Formula-fed babies have more formed stools. If your baby's stools look like little pellets, your baby is constipated. After 2 days of constipation, call your baby's doctor. If your baby will be circumcised, you can care for your baby at home. Gently rinse your baby's penis with warm water after every diaper change. Don't try to remove the film that forms on the penis. This film will go away on its own. Pat dry. Put petroleum ointment, such as Vaseline, on the area of the diaper that will touch your baby's penis. This will keep the diaper from sticking to your baby. Ask the doctor about giving your baby acetaminophen (Tylenol) for pain. Where can you learn more? Go to https://jonnyewmony.ScriptPad. org and sign in to your [employer] account. Enter 01  45 in the [employer] box to learn more about \"Week 37 of Your Pregnancy: Care Instructions. \"     If you do not have an account, please click on the \"Sign Up Now\" link.   Current as of: February 23, 2022               Content Version: 13.3  © 2006-2022 Healthwise, Incorporated. Care instructions adapted under license by [facility] ([facility]). If you have questions about a medical condition or this instruction, always ask your healthcare professional. [address] any warranty or liability for your use of this information.                       GORDO Mcghee CNM,9/1/2022 4:37 PM

## 2022-09-07 ENCOUNTER — ROUTINE PRENATAL (OUTPATIENT)
Dept: OBGYN | Age: 22
End: 2022-09-07

## 2022-09-07 VITALS
DIASTOLIC BLOOD PRESSURE: 72 MMHG | SYSTOLIC BLOOD PRESSURE: 122 MMHG | HEART RATE: 104 BPM | WEIGHT: 170 LBS | BODY MASS INDEX: 32.93 KG/M2

## 2022-09-07 DIAGNOSIS — Z3A.38 38 WEEKS GESTATION OF PREGNANCY: Primary | ICD-10-CM

## 2022-09-07 DIAGNOSIS — O24.419 GESTATIONAL DIABETES MELLITUS (GDM) AFFECTING PREGNANCY: ICD-10-CM

## 2022-09-07 PROCEDURE — 0502F SUBSEQUENT PRENATAL CARE: CPT | Performed by: ADVANCED PRACTICE MIDWIFE

## 2022-09-07 NOTE — PROGRESS NOTES
Kris MARY Rodcherelle is here at 38w0d for:    Chief Complaint   Patient presents with    Routine Prenatal Visit     Check BS log, SVE, pt states no issues or concerns        Estimated Due Date: Estimated Date of Delivery: 22    OB History    Para Term  AB Living   1             SAB IAB Ectopic Molar Multiple Live Births                    # Outcome Date GA Lbr Augustus/2nd Weight Sex Delivery Anes PTL Lv   1 Current                 Past Medical History:   Diagnosis Date    Gestational diabetes mellitus (GDM) affecting pregnancy 2022       History reviewed. No pertinent surgical history. Social History     Tobacco Use   Smoking Status Never   Smokeless Tobacco Never        Social History     Substance and Sexual Activity   Alcohol Use Not Currently       No results found for this visit on 22. HPI: Today for OB visit. Patient denies needs or concerns and states good fetal movements. Patient states she has having some contractions and they were every 6 minutes yesterday. Yes PT denies fever, chills, nausea and vomiting       Vitals:  Estimated body mass index is 32.93 kg/m² as calculated from the following:    Height as of 17: 5' 0.25\" (1.53 m). Weight as of this encounter: 170 lb (77.1 kg). BP: 122/72  Weight: 170 lb (77.1 kg)  Heart Rate: (!) 104  Patient Position: Sitting  Albumin: Negative  Glucose: Negative  Fetal HR: 148  Movement: Present  Presentation: Vertex  Dilation (cm): 2  Effacement: 70  Station: -1  Comments: mid           Abdomen: soft    Results reviewed today:    No results found for this visit on 22. ASSESSMENT & Plan    Diagnosis Orders   1. 38 weeks gestation of pregnancy        2. Gestational diabetes mellitus (GDM) affecting pregnancy                  I am having Kris Vásquez maintain her Prenatal Vit-Fe Fumarate-FA (PRENATAL 19 PO), blood glucose monitor supplies, blood glucose test strips, Drug [place] Unilet Lancets 30G, True Metrix Meter, and Breast Pump. No follow-ups on file. There are no Patient Instructions on file for this visit.              GORDO Clark CNM,9/7/2022 4:52 PM

## 2022-09-12 ENCOUNTER — CARE COORDINATION (OUTPATIENT)
Dept: OTHER | Facility: CLINIC | Age: 22
End: 2022-09-12

## 2022-09-12 NOTE — CARE COORDINATION
ACM attempted to reach patient for Maternity CM follow-up call. HIPAA compliant message left requesting a return phone call. Will attempt to outreach patient again. RIOS Phillips, RN  Associate Care Manager   Cell: [phone]  [email or web address]

## 2022-09-14 ENCOUNTER — ROUTINE PRENATAL (OUTPATIENT)
Dept: OBGYN | Age: 22
End: 2022-09-14

## 2022-09-14 VITALS
HEART RATE: 96 BPM | SYSTOLIC BLOOD PRESSURE: 120 MMHG | DIASTOLIC BLOOD PRESSURE: 70 MMHG | WEIGHT: 168 LBS | BODY MASS INDEX: 32.54 KG/M2

## 2022-09-14 DIAGNOSIS — Z3A.39 39 WEEKS GESTATION OF PREGNANCY: Primary | ICD-10-CM

## 2022-09-14 DIAGNOSIS — O24.419 GESTATIONAL DIABETES MELLITUS (GDM) AFFECTING PREGNANCY: ICD-10-CM

## 2022-09-14 PROCEDURE — 0502F SUBSEQUENT PRENATAL CARE: CPT | Performed by: ADVANCED PRACTICE MIDWIFE

## 2022-09-14 NOTE — PROGRESS NOTES
Kris Vásquez is here at 39w0d for:    Chief Complaint   Patient presents with    Routine Prenatal Visit     Check BS log, SVE, pt would like her membranes stripped, pt unsure if she wants induced. Pt states no issues or concerns        Estimated Due Date: Estimated Date of Delivery: 22    OB History    Para Term  AB Living   1             SAB IAB Ectopic Molar Multiple Live Births                    # Outcome Date GA Lbr Augustus/2nd Weight Sex Delivery Anes PTL Lv   1 Current                 Past Medical History:   Diagnosis Date    Gestational diabetes mellitus (GDM) affecting pregnancy 2022       History reviewed. No pertinent surgical history. Social History     Tobacco Use   Smoking Status Never   Smokeless Tobacco Never        Social History     Substance and Sexual Activity   Alcohol Use Not Currently       No results found for this visit on 22. HPI: Here today for OB visit. Patient would like her membranes stripped. Patient denies needs or concerns at this    Yes PT denies fever, chills, nausea and vomiting       Vitals:  Estimated body mass index is 32.54 kg/m² as calculated from the following:    Height as of 17: 5' 0.25\" (1.53 m). Weight as of this encounter: 168 lb (76.2 kg). BP: 120/70  Weight: 168 lb (76.2 kg)  Heart Rate: 96  Patient Position: Sitting  Albumin: Negative  Glucose: Negative  Fundal Height (cm): 40 cm  Fetal HR: 138  Movement: Present  Presentation: Vertex  Dilation (cm): 2  Effacement: 80  Station: -1  Comments: mid           Abdomen: soft    Results reviewed today:    No results found for this visit on 22. ASSESSMENT & Plan    Diagnosis Orders   1. 39 weeks gestation of pregnancy        2. Gestational diabetes mellitus (GDM) affecting pregnancy              Prefers not induction at this time and her blood sugars are all within normal limits      I am having Kris Vásquez maintain her Prenatal Vit-Fe Fumarate-FA (PRENATAL 19 PO), blood glucose monitor supplies, blood glucose test strips, Drug [place] Unilet Lancets 30G, True Metrix Meter, and Breast Pump. Return for Keep next appt. There are no Patient Instructions on file for this visit.              GORDO Walker CNM,9/14/2022 4:05 PM

## 2022-09-19 ENCOUNTER — ROUTINE PRENATAL (OUTPATIENT)
Dept: OBGYN | Age: 22
End: 2022-09-19
Payer: COMMERCIAL

## 2022-09-19 VITALS
SYSTOLIC BLOOD PRESSURE: 130 MMHG | DIASTOLIC BLOOD PRESSURE: 80 MMHG | HEART RATE: 95 BPM | BODY MASS INDEX: 32.73 KG/M2 | WEIGHT: 169 LBS

## 2022-09-19 DIAGNOSIS — Z3A.39 39 WEEKS GESTATION OF PREGNANCY: Primary | ICD-10-CM

## 2022-09-19 DIAGNOSIS — O24.410 DIET CONTROLLED GESTATIONAL DIABETES MELLITUS (GDM) IN THIRD TRIMESTER: ICD-10-CM

## 2022-09-19 PROCEDURE — 0502F SUBSEQUENT PRENATAL CARE: CPT | Performed by: ADVANCED PRACTICE MIDWIFE

## 2022-09-19 PROCEDURE — 59025 FETAL NON-STRESS TEST: CPT | Performed by: ADVANCED PRACTICE MIDWIFE

## 2022-09-19 NOTE — PROGRESS NOTES
Kris HERNANDEZ Rodcherelle is here at 39w5d for:    Chief Complaint   Patient presents with    Routine Prenatal Visit     NST, check BS log, SVE, pt states no issues or concerns        Estimated Due Date: Estimated Date of Delivery: 22    OB History    Para Term  AB Living   1             SAB IAB Ectopic Molar Multiple Live Births                    # Outcome Date GA Lbr Augustus/2nd Weight Sex Delivery Anes PTL Lv   1 Current                 Past Medical History:   Diagnosis Date    Gestational diabetes mellitus (GDM) affecting pregnancy 2022       History reviewed. No pertinent surgical history. Social History     Tobacco Use   Smoking Status Never   Smokeless Tobacco Never        Social History     Substance and Sexual Activity   Alcohol Use Not Currently       No results found for this visit on 22. HPI: Today for OB visit. Patient is requesting induction at this point in time. NST today    Yes PT denies fever, chills, nausea and vomiting       Vitals:  Estimated body mass index is 32.73 kg/m² as calculated from the following:    Height as of 17: 5' 0.25\" (1.53 m). Weight as of this encounter: 169 lb (76.7 kg). BP: 130/80  Weight: 169 lb (76.7 kg)  Heart Rate: 95  Patient Position: Sitting  Albumin: Negative  Glucose: Negative  Movement: Present           Abdomen: soft    Results reviewed today:    No results found for this visit on 22. ASSESSMENT & Plan    Diagnosis Orders   1. 39 weeks gestation of pregnancy  GA FETAL NON-STRESS TEST      2. Diet controlled gestational diabetes mellitus (GDM) in third trimester  GA FETAL NON-STRESS TEST        Induction consent form reviewed and obtained today        I am having Kris Vásquez maintain her Prenatal Vit-Fe Fumarate-FA (PRENATAL 19 PO), blood glucose monitor supplies, blood glucose test strips, Drug [place] Unilet Lancets 30G, True Metrix Meter, and Breast Pump.     Return in about 3 days (around 2022) for induction thursday. There are no Patient Instructions on file for this visit. Clinical References           Weeks 40 to 39 of Your Pregnancy: Care Instructions  By week 36, you've reached your due date. Your baby could be coming any day. Most babies born after their due dates are healthy. But you may have tests to make sure everything is okay. If you feel stressed, you could try a meditation exercise, such as guided imagery. It may help you relax. If you are past 41 weeks, your doctor may measure the amount of fluid that surrounds your baby. They may also test your baby's movement and heart rate. If you don't start labor on your own by 41 or 42 weeks, your doctor may want to start (induce) labor. If there are other concerns, your doctor may talk to you about a . To start (induce) your labor, your doctor may do any of these things. Place a narrow tube with a small balloon on the end (balloon catheter) into your cervix. The doctor inflates the balloon. This helps your cervix open (dilate). Sweep the membranes (separate the lining of the amniotic sac from the uterus). This helps the uterus make a chemical that can start contractions. \"Break your water\" (rupture the amniotic sac). This may be done if your cervix has started to open. Use medicines. They may be used to soften the cervix or start contractions. How to do guided imagery  Close your eyes, and take a few deep breaths. Picture a peaceful setting, such as a beach or a meadow. Add a winding path. Follow the path until you feel more and more relaxed. Take a few minutes to breathe slowly and feel the calm. When you are ready, slowly take yourself back to the present. Count to 3, and open your eyes. Follow-up care is a key part of your treatment and safety. Be sure to make and go to all appointments, and call your doctor if you are having problems.  It's also a good idea to know your test results and keep a list of the medicines you take. Where can you learn more? Go to [email or web address] and sign in to your [employer] account. Enter A481 in the [facility] box to learn more about \"Weeks 40 to 41 of Your Pregnancy: Care Instructions. \"     If you do not have an account, please click on the \"Sign Up Now\" link. Current as of: February 23, 2022               Content Version: 13.4  © 2006-2022 Healthwise, Incorporated. Care instructions adapted under license by [facility] ([facility]). If you have questions about a medical condition or this instruction, always ask your healthcare professional. [place] [address] any warranty or liability for your use of this information.                       [address] 3:54 PM

## 2022-09-22 ENCOUNTER — ANESTHESIA EVENT (OUTPATIENT)
Dept: LABOR AND DELIVERY | Age: 22
End: 2022-09-22
Payer: COMMERCIAL

## 2022-09-22 ENCOUNTER — HOSPITAL ENCOUNTER (INPATIENT)
Age: 22
LOS: 2 days | Discharge: HOME | End: 2022-09-24
Attending: ADVANCED PRACTICE MIDWIFE | Admitting: ADVANCED PRACTICE MIDWIFE
Payer: COMMERCIAL

## 2022-09-22 ENCOUNTER — APPOINTMENT (OUTPATIENT)
Dept: LABOR AND DELIVERY | Age: 22
End: 2022-09-22
Payer: COMMERCIAL

## 2022-09-22 ENCOUNTER — ANESTHESIA (OUTPATIENT)
Dept: LABOR AND DELIVERY | Age: 22
End: 2022-09-22
Payer: COMMERCIAL

## 2022-09-22 LAB
ABSOLUTE EOS #: 0.06 K/UL (ref 0–0.44)
ABSOLUTE IMMATURE GRANULOCYTE: 0.26 K/UL (ref 0–0.3)
ABSOLUTE LYMPH #: 2.18 K/UL (ref 1.1–3.7)
ABSOLUTE MONO #: 0.97 K/UL (ref 0.1–1.2)
ALBUMIN SERPL-MCNC: 3.5 G/DL (ref 3.5–5.2)
ALBUMIN/GLOBULIN RATIO: 1.4 (ref 1–2.5)
ALP BLD-CCNC: 131 U/L (ref 35–104)
ALT SERPL-CCNC: 13 U/L (ref 5–33)
AMPHETAMINE SCREEN URINE: NEGATIVE
ANION GAP SERPL CALCULATED.3IONS-SCNC: 13 MMOL/L (ref 9–17)
AST SERPL-CCNC: 15 U/L
BARBITURATE SCREEN URINE: NEGATIVE
BASOPHILS # BLD: 0 % (ref 0–2)
BASOPHILS ABSOLUTE: 0.04 K/UL (ref 0–0.2)
BENZODIAZEPINE SCREEN, URINE: NEGATIVE
BILIRUB SERPL-MCNC: 0.3 MG/DL (ref 0.3–1.2)
BUN BLDV-MCNC: 7 MG/DL (ref 6–20)
BUN/CREAT BLD: 17 (ref 9–20)
BUPRENORPHINE URINE: NEGATIVE
CALCIUM SERPL-MCNC: 9.1 MG/DL (ref 8.6–10.4)
CANNABINOID SCREEN URINE: NEGATIVE
CHLORIDE BLD-SCNC: 100 MMOL/L (ref 98–107)
CO2: 20 MMOL/L (ref 20–31)
COCAINE METABOLITE, URINE: NEGATIVE
CREAT SERPL-MCNC: 0.41 MG/DL (ref 0.5–0.9)
EOSINOPHILS RELATIVE PERCENT: 1 % (ref 1–4)
GFR AFRICAN AMERICAN: >60 ML/MIN
GFR NON-AFRICAN AMERICAN: >60 ML/MIN
GFR SERPL CREATININE-BSD FRML MDRD: ABNORMAL ML/MIN/{1.73_M2}
GFR SERPL CREATININE-BSD FRML MDRD: ABNORMAL ML/MIN/{1.73_M2}
GLUCOSE BLD-MCNC: 69 MG/DL (ref 74–100)
GLUCOSE BLD-MCNC: 73 MG/DL (ref 74–100)
GLUCOSE BLD-MCNC: 73 MG/DL (ref 74–100)
GLUCOSE BLD-MCNC: 78 MG/DL (ref 74–100)
GLUCOSE BLD-MCNC: 78 MG/DL (ref 74–100)
GLUCOSE BLD-MCNC: 79 MG/DL (ref 74–100)
GLUCOSE BLD-MCNC: 79 MG/DL (ref 74–100)
GLUCOSE BLD-MCNC: 92 MG/DL (ref 70–99)
HCT VFR BLD CALC: 37.9 % (ref 36.3–47.1)
HEMOGLOBIN: 12.8 G/DL (ref 11.9–15.1)
IMMATURE GRANULOCYTES: 2 %
LYMPHOCYTES # BLD: 18 % (ref 24–43)
MCH RBC QN AUTO: 32.2 PG (ref 25.2–33.5)
MCHC RBC AUTO-ENTMCNC: 33.8 G/DL (ref 28.4–34.8)
MCV RBC AUTO: 95.2 FL (ref 82.6–102.9)
METHADONE SCREEN, URINE: NEGATIVE
METHAMPHETAMINE, URINE: NEGATIVE
MONOCYTES # BLD: 8 % (ref 3–12)
NRBC AUTOMATED: 0 PER 100 WBC
OPIATES, URINE: NEGATIVE
OXYCODONE SCREEN URINE: NEGATIVE
PDW BLD-RTO: 13.6 % (ref 11.8–14.4)
PHENCYCLIDINE, URINE: NEGATIVE
PLATELET # BLD: 212 K/UL (ref 138–453)
PMV BLD AUTO: 10.7 FL (ref 8.1–13.5)
POTASSIUM SERPL-SCNC: 3.9 MMOL/L (ref 3.7–5.3)
PROPOXYPHENE, URINE: NEGATIVE
RBC # BLD: 3.98 M/UL (ref 3.95–5.11)
SEG NEUTROPHILS: 71 % (ref 36–65)
SEGMENTED NEUTROPHILS ABSOLUTE COUNT: 8.44 K/UL (ref 1.5–8.1)
SODIUM BLD-SCNC: 133 MMOL/L (ref 135–144)
TOTAL PROTEIN: 6 G/DL (ref 6.4–8.3)
TRICYCLIC ANTIDEPRESSANTS, UR: NEGATIVE
WBC # BLD: 12 K/UL (ref 3.5–11.3)

## 2022-09-22 PROCEDURE — 6360000002 HC RX W HCPCS: Performed by: NURSE ANESTHETIST, CERTIFIED REGISTERED

## 2022-09-22 PROCEDURE — 0HQ9XZZ REPAIR PERINEUM SKIN, EXTERNAL APPROACH: ICD-10-PCS | Performed by: ADVANCED PRACTICE MIDWIFE

## 2022-09-22 PROCEDURE — 6360000002 HC RX W HCPCS: Performed by: ADVANCED PRACTICE MIDWIFE

## 2022-09-22 PROCEDURE — 2580000003 HC RX 258: Performed by: ADVANCED PRACTICE MIDWIFE

## 2022-09-22 PROCEDURE — 59400 OBSTETRICAL CARE: CPT | Performed by: ADVANCED PRACTICE MIDWIFE

## 2022-09-22 PROCEDURE — 80306 DRUG TEST PRSMV INSTRMNT: CPT

## 2022-09-22 PROCEDURE — 7200000001 HC VAGINAL DELIVERY

## 2022-09-22 PROCEDURE — 82947 ASSAY GLUCOSE BLOOD QUANT: CPT

## 2022-09-22 PROCEDURE — 3700000025 EPIDURAL BLOCK: Performed by: NURSE ANESTHETIST, CERTIFIED REGISTERED

## 2022-09-22 PROCEDURE — 85025 COMPLETE CBC W/AUTO DIFF WBC: CPT

## 2022-09-22 PROCEDURE — 80053 COMPREHEN METABOLIC PANEL: CPT

## 2022-09-22 PROCEDURE — 3E033VJ INTRODUCTION OF OTHER HORMONE INTO PERIPHERAL VEIN, PERCUTANEOUS APPROACH: ICD-10-PCS | Performed by: ADVANCED PRACTICE MIDWIFE

## 2022-09-22 PROCEDURE — 10907ZC DRAINAGE OF AMNIOTIC FLUID, THERAPEUTIC FROM PRODUCTS OF CONCEPTION, VIA NATURAL OR ARTIFICIAL OPENING: ICD-10-PCS | Performed by: ADVANCED PRACTICE MIDWIFE

## 2022-09-22 PROCEDURE — 6370000000 HC RX 637 (ALT 250 FOR IP): Performed by: ADVANCED PRACTICE MIDWIFE

## 2022-09-22 PROCEDURE — 36415 COLL VENOUS BLD VENIPUNCTURE: CPT

## 2022-09-22 PROCEDURE — 51702 INSERT TEMP BLADDER CATH: CPT

## 2022-09-22 PROCEDURE — 1220000000 HC SEMI PRIVATE OB R&B

## 2022-09-22 RX ORDER — ROPIVACAINE HYDROCHLORIDE 2 MG/ML
INJECTION, SOLUTION EPIDURAL; INFILTRATION; PERINEURAL CONTINUOUS PRN
Status: DISCONTINUED | OUTPATIENT
Start: 2022-09-22 | End: 2022-09-22 | Stop reason: SDUPTHER

## 2022-09-22 RX ORDER — IBUPROFEN 800 MG/1
800 TABLET ORAL EVERY 8 HOURS
Status: DISCONTINUED | OUTPATIENT
Start: 2022-09-22 | End: 2022-09-24 | Stop reason: HOSPADM

## 2022-09-22 RX ORDER — SODIUM CHLORIDE, SODIUM LACTATE, POTASSIUM CHLORIDE, AND CALCIUM CHLORIDE .6; .31; .03; .02 G/100ML; G/100ML; G/100ML; G/100ML
1000 INJECTION, SOLUTION INTRAVENOUS PRN
Status: DISCONTINUED | OUTPATIENT
Start: 2022-09-22 | End: 2022-09-24 | Stop reason: HOSPADM

## 2022-09-22 RX ORDER — SODIUM CHLORIDE, SODIUM LACTATE, POTASSIUM CHLORIDE, CALCIUM CHLORIDE 600; 310; 30; 20 MG/100ML; MG/100ML; MG/100ML; MG/100ML
INJECTION, SOLUTION INTRAVENOUS CONTINUOUS
Status: DISCONTINUED | OUTPATIENT
Start: 2022-09-22 | End: 2022-09-24 | Stop reason: HOSPADM

## 2022-09-22 RX ORDER — CARBOPROST TROMETHAMINE 250 UG/ML
250 INJECTION, SOLUTION INTRAMUSCULAR PRN
Status: DISCONTINUED | OUTPATIENT
Start: 2022-09-22 | End: 2022-09-24 | Stop reason: HOSPADM

## 2022-09-22 RX ORDER — METHYLERGONOVINE MALEATE 0.2 MG/ML
200 INJECTION INTRAVENOUS PRN
Status: DISCONTINUED | OUTPATIENT
Start: 2022-09-22 | End: 2022-09-24 | Stop reason: HOSPADM

## 2022-09-22 RX ORDER — SODIUM CHLORIDE 0.9 % (FLUSH) 0.9 %
5-40 SYRINGE (ML) INJECTION PRN
Status: DISCONTINUED | OUTPATIENT
Start: 2022-09-22 | End: 2022-09-24 | Stop reason: HOSPADM

## 2022-09-22 RX ORDER — NALBUPHINE HCL 10 MG/ML
10 AMPUL (ML) INJECTION
Status: DISCONTINUED | OUTPATIENT
Start: 2022-09-22 | End: 2022-09-24 | Stop reason: HOSPADM

## 2022-09-22 RX ORDER — SEVOFLURANE 250 ML/250ML
1 LIQUID RESPIRATORY (INHALATION) CONTINUOUS PRN
Status: DISCONTINUED | OUTPATIENT
Start: 2022-09-22 | End: 2022-09-24 | Stop reason: HOSPADM

## 2022-09-22 RX ORDER — SODIUM CHLORIDE, SODIUM LACTATE, POTASSIUM CHLORIDE, AND CALCIUM CHLORIDE .6; .31; .03; .02 G/100ML; G/100ML; G/100ML; G/100ML
500 INJECTION, SOLUTION INTRAVENOUS PRN
Status: DISCONTINUED | OUTPATIENT
Start: 2022-09-22 | End: 2022-09-24 | Stop reason: HOSPADM

## 2022-09-22 RX ORDER — DOCUSATE SODIUM 100 MG/1
100 CAPSULE, LIQUID FILLED ORAL 2 TIMES DAILY PRN
Status: DISCONTINUED | OUTPATIENT
Start: 2022-09-22 | End: 2022-09-24 | Stop reason: HOSPADM

## 2022-09-22 RX ORDER — SODIUM CHLORIDE 0.9 % (FLUSH) 0.9 %
5-40 SYRINGE (ML) INJECTION EVERY 12 HOURS SCHEDULED
Status: DISCONTINUED | OUTPATIENT
Start: 2022-09-22 | End: 2022-09-24 | Stop reason: HOSPADM

## 2022-09-22 RX ORDER — EPHEDRINE SULFATE/0.9% NACL/PF 50 MG/5 ML
5 SYRINGE (ML) INTRAVENOUS EVERY 5 MIN PRN
Status: DISCONTINUED | OUTPATIENT
Start: 2022-09-22 | End: 2022-09-24 | Stop reason: HOSPADM

## 2022-09-22 RX ORDER — SODIUM CHLORIDE 9 MG/ML
25 INJECTION, SOLUTION INTRAVENOUS PRN
Status: DISCONTINUED | OUTPATIENT
Start: 2022-09-22 | End: 2022-09-24 | Stop reason: HOSPADM

## 2022-09-22 RX ORDER — MISOPROSTOL 100 UG/1
800 TABLET ORAL PRN
Status: DISCONTINUED | OUTPATIENT
Start: 2022-09-22 | End: 2022-09-24 | Stop reason: HOSPADM

## 2022-09-22 RX ORDER — SODIUM CHLORIDE 9 MG/ML
INJECTION, SOLUTION INTRAVENOUS PRN
Status: DISCONTINUED | OUTPATIENT
Start: 2022-09-22 | End: 2022-09-24 | Stop reason: HOSPADM

## 2022-09-22 RX ORDER — MISOPROSTOL 100 UG/1
900 TABLET ORAL PRN
Status: DISCONTINUED | OUTPATIENT
Start: 2022-09-22 | End: 2022-09-24 | Stop reason: HOSPADM

## 2022-09-22 RX ORDER — ACETAMINOPHEN 500 MG
1000 TABLET ORAL EVERY 8 HOURS PRN
Status: DISCONTINUED | OUTPATIENT
Start: 2022-09-22 | End: 2022-09-24 | Stop reason: HOSPADM

## 2022-09-22 RX ORDER — ONDANSETRON 2 MG/ML
4 INJECTION INTRAMUSCULAR; INTRAVENOUS EVERY 6 HOURS PRN
Status: DISCONTINUED | OUTPATIENT
Start: 2022-09-22 | End: 2022-09-24 | Stop reason: HOSPADM

## 2022-09-22 RX ORDER — LIDOCAINE HYDROCHLORIDE 10 MG/ML
30 INJECTION, SOLUTION EPIDURAL; INFILTRATION; INTRACAUDAL; PERINEURAL PRN
Status: DISCONTINUED | OUTPATIENT
Start: 2022-09-22 | End: 2022-09-24 | Stop reason: HOSPADM

## 2022-09-22 RX ORDER — MODIFIED LANOLIN
OINTMENT (GRAM) TOPICAL PRN
Status: DISCONTINUED | OUTPATIENT
Start: 2022-09-22 | End: 2022-09-24 | Stop reason: HOSPADM

## 2022-09-22 RX ORDER — ROPIVACAINE HYDROCHLORIDE 2 MG/ML
10 INJECTION, SOLUTION EPIDURAL; INFILTRATION; PERINEURAL CONTINUOUS
Status: DISCONTINUED | OUTPATIENT
Start: 2022-09-22 | End: 2022-09-24 | Stop reason: HOSPADM

## 2022-09-22 RX ORDER — ACETAMINOPHEN 325 MG/1
650 TABLET ORAL EVERY 4 HOURS PRN
Status: DISCONTINUED | OUTPATIENT
Start: 2022-09-22 | End: 2022-09-24 | Stop reason: HOSPADM

## 2022-09-22 RX ADMIN — SODIUM CHLORIDE, POTASSIUM CHLORIDE, SODIUM LACTATE AND CALCIUM CHLORIDE: 600; 310; 30; 20 INJECTION, SOLUTION INTRAVENOUS at 06:00

## 2022-09-22 RX ADMIN — IBUPROFEN 800 MG: 800 TABLET, FILM COATED ORAL at 23:44

## 2022-09-22 RX ADMIN — Medication: at 23:17

## 2022-09-22 RX ADMIN — ROPIVACAINE HYDROCHLORIDE 10 ML/HR: 2 INJECTION, SOLUTION EPIDURAL; INFILTRATION at 19:07

## 2022-09-22 RX ADMIN — BENZOCAINE AND LEVOMENTHOL: 200; 5 SPRAY TOPICAL at 23:17

## 2022-09-22 RX ADMIN — SODIUM CHLORIDE, POTASSIUM CHLORIDE, SODIUM LACTATE AND CALCIUM CHLORIDE: 600; 310; 30; 20 INJECTION, SOLUTION INTRAVENOUS at 12:18

## 2022-09-22 RX ADMIN — ROPIVACAINE HYDROCHLORIDE 10 ML/HR: 2 INJECTION, SOLUTION EPIDURAL; INFILTRATION at 12:30

## 2022-09-22 RX ADMIN — SODIUM CHLORIDE, POTASSIUM CHLORIDE, SODIUM LACTATE AND CALCIUM CHLORIDE: 600; 310; 30; 20 INJECTION, SOLUTION INTRAVENOUS at 15:31

## 2022-09-22 RX ADMIN — NALBUPHINE HYDROCHLORIDE 10 MG: 10 INJECTION, SOLUTION INTRAMUSCULAR; INTRAVENOUS; SUBCUTANEOUS at 11:04

## 2022-09-22 RX ADMIN — Medication 1 MILLI-UNITS/MIN: at 06:15

## 2022-09-22 RX ADMIN — ONDANSETRON 4 MG: 2 INJECTION INTRAMUSCULAR; INTRAVENOUS at 19:58

## 2022-09-22 ASSESSMENT — PAIN SCALES - GENERAL
PAINLEVEL_OUTOF10: 4
PAINLEVEL_OUTOF10: 7

## 2022-09-22 ASSESSMENT — PAIN DESCRIPTION - LOCATION: LOCATION: ABDOMEN

## 2022-09-22 ASSESSMENT — PAIN DESCRIPTION - DESCRIPTORS: DESCRIPTORS: CRAMPING

## 2022-09-22 NOTE — PROGRESS NOTES
Department of Obstetrics and Gynecology   Progress Note      SUBJECTIVE:  PT STATES SHE IS FEELING WELL WITH THE EPIDURAL. PT DENIES NEEDS AT THIS TIME.      OBJECTIVE:      Vitals:    09/22/22 1739 09/22/22 1752 09/22/22 1800 09/22/22 1807   BP: 113/63 (!) 112/56  (!) 120/59   Pulse: (!) 102 96  86   Resp:   16    Temp:   99.1 °F (37.3 °C)    TempSrc:   Oral    SpO2:       Weight:       Height:           Fetal heart rate:       Baseline Heart Rate:  130        Accelerations:  present       Long Term Variability:  moderate       Decelerations:  absent         Contraction frequency: 1-3 minutes        Cervix:         Dilation:  8-9         Effacement:  anterior 80 /90 the rest         Station:  -1/0         Position:  mid             ASSESSMENT & PLAN:    Continue care  Reposition to [employer]

## 2022-09-22 NOTE — H&P
Department of Obstetrics and Gynecology   Obstetrics History and Physical  H&P Admission Inpatient  Note        CHIEF COMPLAINT:    Chief Complaint   Patient presents with    Scheduled Induction    Pt presents to labor and delivery for induction pt is post dates with gestational diabetes    HISTORY OF PRESENT ILLNESS:      The patient is a 23 y.o. female at 44w3d. OB History          1    Para        Term                AB        Living             SAB        IAB        Ectopic        Molar        Multiple        Live Births                Patient presents with a chief complaint as above and is being admitted for induction    Estimated Due Date: Estimated Date of Delivery: 22    PRENATAL CARE:    Complicated by: gestational diabetes class diet controlled    PAST OB HISTORY:  OB History          1    Para        Term                AB        Living             SAB        IAB        Ectopic        Molar        Multiple        Live Births                    Past Medical History:        Diagnosis Date    Gestational diabetes mellitus (GDM) affecting pregnancy 2022     Past Surgical History:    No past surgical history on file. Allergies:  Patient has no known allergies.     Social History:    Social History     Socioeconomic History    Marital status:      Spouse name: Not on file    Number of children: Not on file    Years of education: Not on file    Highest education level: Not on file   Occupational History    Not on file   Tobacco Use    Smoking status: Never    Smokeless tobacco: Never   Vaping Use    Vaping Use: Never used   Substance and Sexual Activity    Alcohol use: Not Currently    Drug use: Not Currently    Sexual activity: Not on file   Other Topics Concern    Not on file   Social History Narrative    Not on file     Social Determinants of Health     Financial Resource Strain: Not on file   Food Insecurity: Not on file   Transportation Needs: Not on file Physical Activity: Not on file   Stress: Not on file   Social Connections: Not on file   Intimate Partner Violence: Not on file   Housing Stability: Not on file     Family History:       Problem Relation Age of Onset    Colon Cancer Paternal Grandfather     No Known Problems Paternal Grandmother     No Known Problems Maternal Grandmother     Heart Disease Maternal Grandfather     Hypertension Father     Diabetes Father     Hypertension Mother     No Known Problems Brother     No Known Problems Brother      Medications Prior to Admission:  Medications Prior to Admission: Misc. Devices (BREAST PUMP) MISC, 1 Units by Does not apply route 5 times daily double pump - feeding problem of the  P92.9 Care of the lactating mother Z39.1 (Patient not taking: No sig reported)  Blood Glucose Monitoring Suppl (TRUE METRIX METER) w/Device KIT, USE METER TO CHECK BLOOD SUGAR FOUR TIMES DAILY  Drug [place] Lancets 30G MISC, USE LANCET TO TEST BLOOD SUGAR FOUR TIMES DAILY  blood glucose monitor supplies, 1 each by Other route 4 times daily One glucometer, test strips, lancets - per insurance coverage  blood glucose monitor strips, Pt to test four times daily fastin, 2 hr pp  Prenatal Vit-Fe Fumarate-FA (PRENATAL 19 PO), Take by mouth    REVIEW OF SYSTEMS:    CONSTITUTIONAL:  negative  RESPIRATORY:  negative  CARDIOVASCULAR:  negative  GASTROINTESTINAL:  negative  ALLERGIC/IMMUNOLOGIC:  negative  NEUROLOGICAL:  negative  BEHAVIOR/PSYCH:  negative    PHYSICAL EXAM:  Vitals:    / 0531   BP: 124/80   Pulse: 90   Resp: 18   Temp: 98 °F (36.7 °C)   TempSrc: Oral   SpO2: 96%   Weight: 169 lb (76.7 kg)   Height: 5' (1.524 m)     General appearance:  awake, alert, cooperative, no apparent distress, and appears stated age  Neurologic:  Awake, alert, oriented to name, place and time.     Lungs:  No increased work of breathing, good air exchange  Abdomen:  Soft, non tender, gravid, consistent with her gestational age, EFW by Yossi's manouever was 8-2   Fetal heart rate:  Reassuring. Pelvis:  Adequate pelvis  Cervix: 2 cm 80 soft -1  Contraction frequency:  3-4 minutes    Membranes:  Ruptured clear fluid    Labs:  Blood Type: O POSITIVE    Rubella:    Rubella Antibody, IGG   Date Value Ref Range Status   03/14/2022 72.5 IU/mL Final     Comment:                 REFERENCE RANGE:  <5.0       NON-REACTIVE (non-immune)  5.0 TO 9.9 EQUIVOCAL  >=10.0     REACTIVE     (immune)             T. Pallidium, IGG:    T. pallidum, IgG   Date Value Ref Range Status   03/14/2022 NONREACTIVE NONREACTIVE Final     Comment:           T. pallidum antibodies are not detected. There is no serological evidence of infection with T. pallidum (early primary syphilis   cannot be excluded). Retest in 2-4 weeks if syphilis is clinically suspect.              Sickle Cell Screen: No results found for: SICKLE  Hepatitis B Surface Antigen:   Hepatitis B Surface Ag   Date Value Ref Range Status   03/14/2022 NONREACTIVE NONREACTIVE Final     HIV:  No results found for: [email or web address] [place]           Results for orders placed or performed during the hospital encounter of 09/22/22   DRUG SCREEN MULTI URINE   Result Value Ref Range    Amphetamine Screen, Ur NEGATIVE NEGATIVE    Barbiturate Screen, Ur NEGATIVE NEGATIVE    Benzodiazepine Screen, Urine NEGATIVE NEGATIVE    Cocaine Metabolite, Urine NEGATIVE NEGATIVE    Methadone Screen, Urine NEGATIVE NEGATIVE    Opiates, Urine NEGATIVE NEGATIVE    Phencyclidine, Urine NEGATIVE NEGATIVE    Propoxyphene, Urine NEGATIVE NEGATIVE    Cannabinoid Scrn, Ur NEGATIVE NEGATIVE    Oxycodone Screen, Ur NEGATIVE NEGATIVE    Methamphetamine, Urine NEGATIVE NEGATIVE    Tricyclic Antidepressants, Urine NEGATIVE NEGATIVE    Buprenorphine Urine NEGATIVE NEGATIVE   CBC auto differential   Result Value Ref Range    WBC 12.0 (H) 3.5 - 11.3 k/uL    RBC 3.98 3.95 - 5.11 m/uL    Hemoglobin 12.8 11.9 - 15.1 g/dL    Hematocrit 37.9 36.3 - 47.1 %    MCV 95.2 82.6 - 102.9 fL    MCH 32.2 25.2 - 33.5 pg    MCHC 33.8 28.4 - 34.8 g/dL    RDW 13.6 11.8 - 14.4 %    Platelets 982 564 - 099 k/uL    MPV 10.7 8.1 - 13.5 fL    NRBC Automated 0.0 0.0 per 100 WBC    Seg Neutrophils 71 (H) 36 - 65 %    Lymphocytes 18 (L) 24 - 43 %    Monocytes 8 3 - 12 %    Eosinophils % 1 1 - 4 %    Basophils 0 0 - 2 %    Immature Granulocytes 2 (H) 0 %    Segs Absolute 8.44 (H) 1.50 - 8.10 k/uL    Absolute Lymph # 2.18 1.10 - 3.70 k/uL    Absolute Mono # 0.97 0.10 - 1.20 k/uL    Absolute Eos # 0.06 0.00 - 0.44 k/uL    Basophils Absolute 0.04 0.00 - 0.20 k/uL    Absolute Immature Granulocyte 0.26 0.00 - 0.30 k/uL   Comprehensive metabolic panel   Result Value Ref Range    Glucose 92 70 - 99 mg/dL    BUN 7 6 - 20 mg/dL    Creatinine 0.41 (L) 0.50 - 0.90 mg/dL    Bun/Cre Ratio 17 9 - 20    Calcium 9.1 8.6 - 10.4 mg/dL    Sodium 133 (L) 135 - 144 mmol/L    Potassium 3.9 3.7 - 5.3 mmol/L    Chloride 100 98 - 107 mmol/L    CO2 20 20 - 31 mmol/L    Anion Gap 13 9 - 17 mmol/L    Alkaline Phosphatase 131 (H) 35 - 104 U/L    ALT 13 5 - 33 U/L    AST 15 <32 U/L    Total Bilirubin 0.3 0.3 - 1.2 mg/dL    Total Protein 6.0 (L) 6.4 - 8.3 g/dL    Albumin 3.5 3.5 - 5.2 g/dL    Albumin/Globulin Ratio 1.4 1.0 - 2.5    GFR Non-African American >60 >60 mL/min    GFR African American >60 >60 mL/min    GFR Comment          GFR Staging         Glucose, Whole Blood   Result Value Ref Range    POC Glucose 78 74 - 100 mg/dL       ASSESSMENT AND PLAN:    Estimated length of stay: 2 midnights post vaginal delivery    Principal Problem:    Post-term infant with 40-42 completed weeks of gestation    Active Problems:    Gestational diabetes mellitus (GDM) affecting pregnancy        Labor: Admit, anticipate normal delivery, routine labor orders  Fetus: Reassuring, Cat 1  GBS: No  Other: pitocin, epidural    Reviewed plan with Dr. Pauline Sprague, GORDO - TESS,CNM,9/22/2022 7:37 AM

## 2022-09-22 NOTE — ANESTHESIA PROCEDURE NOTES
Epidural Block    Patient location during procedure: OB  Start time: 9/22/2022 12:15 PM  End time: 9/22/2022 12:30 PM  Reason for block: labor epidural  Staffing  Performed: resident/CRNA   Resident/CRNA: GORDO Mendez CRNA  Epidural  Patient position: sitting  Prep: ChloraPrep and site prepped and draped  Patient monitoring: continuous pulse ox and frequent blood pressure checks  Approach: midline  Location: L4-5  Injection technique: EMMANUELLE saline  Guidance: anatomy guided.   Provider prep: mask and sterile gloves  Needle  Needle type: Tuohy   Needle gauge: 17 G  Needle length: 3.5 in  Needle insertion depth: 7 cm  Catheter type: side hole  Catheter size: 19 G  Catheter at skin depth: 14 cm  Test dose: negative  Kit: Jimenez Perifix  Lot number: [id number]  Expiration date: 9/30/2023Catheter Secured: tegaderm and tape  Assessment  Sensory level: T4  Hemodynamics: stable  Attempts: 1  Outcomes: uncomplicated and patient tolerated procedure well  Preanesthetic Checklist  Completed: patient identified, IV checked, site marked, risks and benefits discussed, surgical/procedural consents, equipment checked, pre-op evaluation, timeout performed, anesthesia consent given, oxygen available and monitors applied/VS acknowledged

## 2022-09-22 NOTE — PLAN OF CARE
Problem: Vaginal Birth or  Section  Goal: Fetal and maternal status remain reassuring during the birth process  Description:  Birth OB-Pregnancy care plan goal which identifies if the fetal and maternal status remain reassuring during the birth process  Outcome: Progressing     Problem: Pain  Goal: Verbalizes/displays adequate comfort level or baseline comfort level  Outcome: Progressing     Problem: Infection - Adult  Goal: Absence of infection at discharge  Outcome: Progressing  Goal: Absence of infection during hospitalization  Outcome: Progressing  Goal: Absence of fever/infection during anticipated neutropenic period  Outcome: Progressing     Problem: Safety - Adult  Goal: Free from fall injury  Outcome: Progressing     Problem: Discharge Planning  Goal: Discharge to home or other facility with appropriate resources  Outcome: Progressing     Problem: Chronic Conditions and Co-morbidities  Goal: Patient's chronic conditions and co-morbidity symptoms are monitored and maintained or improved  Outcome: Progressing

## 2022-09-22 NOTE — ANESTHESIA PRE PROCEDURE
Department of Anesthesiology  Preprocedure Note       Name:  Jesusita Vásquez   Age:  23 y. o.  :  2000                                          MRN:  [id number]         Date:  2022      Surgeon: * No surgeons listed *    Procedure: * No procedures listed *    Medications prior to admission:   Prior to Admission medications    Medication Sig Start Date End Date Taking? Authorizing Provider   Misc.  Devices (BREAST PUMP) MISC 1 Units by Does not apply route 5 times daily double pump - feeding problem of the  P92.9  Care of the lactating mother Z39.1  Patient not taking: No sig reported 22  Lala Ashlyn, APRN - CNM   Blood Glucose Monitoring Suppl (TRUE METRIX METER) w/Device KIT USE METER TO CHECK BLOOD SUGAR FOUR TIMES DAILY 22   Lala Ashlyn, APRN - CNM   Drug [place] Unilet Lancets 30G MISC USE LANCET TO TEST BLOOD SUGAR FOUR TIMES DAILY 22   Historical Provider, MD   blood glucose monitor supplies 1 each by Other route 4 times daily One glucometer, test strips, lancets - per insurance coverage 22  Lala Ashlyn, APRN - CNM   blood glucose monitor strips Pt to test four times daily fastin, 2 hr pp 22   Lala Ashlyn, APRN - CNM   Prenatal Vit-Fe Fumarate-FA (PRENATAL 19 PO) Take by mouth    Historical Provider, MD       Current medications:    Current Facility-Administered Medications   Medication Dose Route Frequency Provider Last Rate Last Admin    oxytocin (PITOCIN) 30 units in 500 mL infusion  1-24 vincenzo-units/min IntraVENous Continuous Lala Ashlyn, APRN - CNM 13 mL/hr at 22 1100 13 vincenzo-units/min at 22 1100    lactated ringers infusion   IntraVENous Continuous Lala Ashlyn, APRN -  mL/hr at 22 0600 New Bag at 22 0600    lactated ringers bolus  500 mL IntraVENous PRN Lala Ashlyn, APRN - CNM        Or    lactated ringers bolus  1,000 mL IntraVENous PRN Lala Ashlyn, APRN - CNM        sodium chloride flush 0.9 % injection 5-40 mL  5-40 mL IntraVENous 2 times per day Jessee Gideon, APRN - CNM        sodium chloride flush 0.9 % injection 5-40 mL  5-40 mL IntraVENous PRN Jessee Gideon, APRN - CNM        0.9 % sodium chloride infusion  25 mL IntraVENous PRN [place] Gideon, APRN - CNM        lidocaine PF 1 % injection 30 mL  30 mL Other PRN [place] Gideon, APRN - CNM        nalbuphine (NUBAIN) injection 10 mg  10 mg IntraVENous Q2H PRN [place] Gideon, APRN - CNM   10 mg at 09/22/22 1104    butorphanol (STADOL) injection 1 mg  1 mg IntraVENous Q3H PRN [place] Gideon, APRN - CNM        nitrous oxide 50% inhalation 1 each  1 each Inhalation Continuous PRN Jessee Gideon, APRN - CNM        ondansetron [facility]) injection 4 mg  4 mg IntraVENous Q6H PRN [place] Gideon, APRN - CNM        oxytocin (PITOCIN) 30 units in 500 mL infusion  166 vincenzo-units/min IntraVENous PRN [place] Gideon, APRN - CNM        And    oxytocin (PITOCIN) 10 unit bolus from the bag  999 mL/hr IntraVENous PRN Jessee Gideon, APRN - CNM        methylergonovine (METHERGINE) injection 200 mcg  200 mcg IntraMUSCular PRN Jessee Gideon, APRN - CNM        carboprost (HEMABATE) injection 250 mcg  250 mcg IntraMUSCular PRN [place] Gideon, APRN - CNM        miSOPROStol (CYTOTEC) tablet 900 mcg  900 mcg Rectal PRN [place] Gideon, APRN - CNM        TRANEXAMIC ACID 1000 MG  ML NS (PREMIX) IVPB 1,000 mg  1,000 mg IntraVENous Once PRN [place] Gideon, APRN - CNM        acetaminophen (TYLENOL) tablet 650 mg  650 mg Oral Q4H PRN [place] Gideon, APRN - CNM        witch hazel-glycerin (TUCKS) pad   Topical PRN Jessee Gideon, APRN - CNM        benzocaine-menthol (DERMOPLAST) 20-0.5 % spray   Topical PRN Jessee Gideon, APRN - CNM           Allergies:  No Known Allergies    Problem List:    Patient Active Problem List   Diagnosis Code    Gestational diabetes mellitus (GDM) affecting pregnancy O23.80    Post-term infant with 40-42 completed weeks of gestation P08.21       Past Medical History:        Diagnosis Date    Gestational diabetes mellitus (GDM) affecting pregnancy 7/14/2022       Past Surgical History:  No past surgical history on file. Social History:    Social History     Tobacco Use    Smoking status: Never    Smokeless tobacco: Never   Substance Use Topics    Alcohol use: Not Currently                                Counseling given: Not Answered      Vital Signs (Current):   Vitals:    09/22/22 1051 09/22/22 1100 09/22/22 1130 09/22/22 1200   BP: 138/67      Pulse: 86      Resp:  16 16 16   Temp:    37 °C (98.6 °F)   TempSrc:    Oral   SpO2:       Weight:       Height:                                                  BP Readings from Last 3 Encounters:   09/22/22 138/67   09/19/22 130/80   09/14/22 120/70       NPO Status:                                                                                 BMI:   Wt Readings from Last 3 Encounters:   09/22/22 169 lb (76.7 kg)   09/19/22 169 lb (76.7 kg)   09/14/22 168 lb (76.2 kg)     Body mass index is 33.01 kg/m².     CBC:   Lab Results   Component Value Date/Time    WBC 12.0 09/22/2022 06:06 AM    RBC 3.98 09/22/2022 06:06 AM    HGB 12.8 09/22/2022 06:06 AM    HCT 37.9 09/22/2022 06:06 AM    MCV 95.2 09/22/2022 06:06 AM    RDW 13.6 09/22/2022 06:06 AM     09/22/2022 06:06 AM       CMP:   Lab Results   Component Value Date/Time     09/22/2022 06:06 AM    K 3.9 09/22/2022 06:06 AM     09/22/2022 06:06 AM    CO2 20 09/22/2022 06:06 AM    BUN 7 09/22/2022 06:06 AM    CREATININE 0.41 09/22/2022 06:06 AM    GFRAA >60 09/22/2022 06:06 AM    LABGLOM >60 09/22/2022 06:06 AM    GLUCOSE 92 09/22/2022 06:06 AM    PROT 6.0 09/22/2022 06:06 AM    CALCIUM 9.1 09/22/2022 06:06 AM    BILITOT 0.3 09/22/2022 06:06 AM    ALKPHOS 131 09/22/2022 06:06 AM    AST 15 09/22/2022 06:06 AM    ALT 13 09/22/2022 06:06 AM       POC Tests:   Recent Labs     09/22/22  1058   POCGLU 79       Coags: No results found for: PROTIME, INR, APTT    HCG (If Applicable): No results found for: PREGTESTUR, PREGSERUM, HCG, HCGQUANT     ABGs: No results found for: PHART, PO2ART, QVC8KNB, YWT0BMQ, BEART, I1HMRQTE     Type & Screen (If Applicable):  No results found for: LABABO, LABRH    Drug/Infectious Status (If Applicable):  Lab Results   Component Value Date/Time    HEPCAB NONREACTIVE 03/14/2022 06:20 PM       COVID-19 Screening (If Applicable): No results found for: COVID19        Anesthesia Evaluation  Patient summary reviewed and Nursing notes reviewed no history of anesthetic complications:   Airway: Mallampati: I  TM distance: >3 FB   Neck ROM: full  Mouth opening: > = 3 FB   Dental: normal exam         Pulmonary:Negative Pulmonary ROS and normal exam                               Cardiovascular:Negative CV ROS  Exercise tolerance: good (>4 METS),            Beta Blocker:  Not on Beta Blocker         Neuro/Psych:   Negative Neuro/Psych ROS              GI/Hepatic/Renal:   (+) GERD: well controlled,           Endo/Other:    (+) Diabetes (gestational.), . Abdominal:             Vascular: negative vascular ROS. Other Findings:           Anesthesia Plan      epidural     ASA 2             Anesthetic plan and risks discussed with patient. Plan discussed with CRNA.                     GORDO Graham - CRNA   9/22/2022

## 2022-09-23 LAB — GLUCOSE BLD-MCNC: 85 MG/DL (ref 74–100)

## 2022-09-23 PROCEDURE — 82947 ASSAY GLUCOSE BLOOD QUANT: CPT

## 2022-09-23 PROCEDURE — 1220000000 HC SEMI PRIVATE OB R&B

## 2022-09-23 PROCEDURE — 6370000000 HC RX 637 (ALT 250 FOR IP): Performed by: ADVANCED PRACTICE MIDWIFE

## 2022-09-23 PROCEDURE — 2580000003 HC RX 258: Performed by: ADVANCED PRACTICE MIDWIFE

## 2022-09-23 RX ADMIN — IBUPROFEN 800 MG: 800 TABLET, FILM COATED ORAL at 09:37

## 2022-09-23 RX ADMIN — IBUPROFEN 800 MG: 800 TABLET, FILM COATED ORAL at 20:31

## 2022-09-23 RX ADMIN — SODIUM CHLORIDE, PRESERVATIVE FREE 10 ML: 5 INJECTION INTRAVENOUS at 09:38

## 2022-09-23 ASSESSMENT — PAIN SCALES - GENERAL: PAINLEVEL_OUTOF10: 6

## 2022-09-23 ASSESSMENT — PAIN DESCRIPTION - LOCATION: LOCATION: ABDOMEN

## 2022-09-23 ASSESSMENT — PAIN DESCRIPTION - DESCRIPTORS: DESCRIPTORS: CRAMPING

## 2022-09-23 ASSESSMENT — PAIN - FUNCTIONAL ASSESSMENT: PAIN_FUNCTIONAL_ASSESSMENT: ACTIVITIES ARE NOT PREVENTED

## 2022-09-23 NOTE — PLAN OF CARE
Problem: Postpartum  Goal: Experiences normal postpartum course  Description:  Postpartum OB-Pregnancy care plan goal which identifies if the mother is experiencing a normal postpartum course  Outcome: Progressing     Problem: Postpartum  Goal: Appropriate maternal -  bonding  Description:  Postpartum OB-Pregnancy care plan goal which identifies if the mother and  are bonding appropriately  Outcome: Progressing     Problem: Postpartum  Goal: Establishment of infant feeding pattern  Description:  Postpartum OB-Pregnancy care plan goal which identifies if the mother is establishing a feeding pattern with their   Outcome: Progressing     Problem: Pain  Goal: Verbalizes/displays adequate comfort level or baseline comfort level  Outcome: Progressing     Problem: Infection - Adult  Goal: Absence of infection at discharge  Outcome: Progressing     Problem: Infection - Adult  Goal: Absence of infection during hospitalization  Outcome: Progressing     Problem: Safety - Adult  Goal: Free from fall injury  Outcome: Progressing     Problem: Vaginal Birth or  Section  Goal: Fetal and maternal status remain reassuring during the birth process  Description:  Birth OB-Pregnancy care plan goal which identifies if the fetal and maternal status remain reassuring during the birth process  Outcome: Completed

## 2022-09-23 NOTE — LACTATION NOTE
Lactation education:    [x] Latch/ good latch vs shallow latch/ steps to obtaining deep latch    [x] How to know if infant is eating enough/ feedings per 24 hours, wet/dirty diapers    [x] Feeding/satiety cues      Lactation education resources given:     [x]  How to Breastfeed your baby - [address] publication      [x]  Follow up support information    [x]  Breast milk storage guidelines - [facility]    [x]  Breastpump cleaning guidelines - [facility]     [x]  Breastfeeding & Safe Sleep handout - [address] publication    [x]  Calling All Dads! Handout - [address] publication      []  Breast and Nipple Care - [facility]     []  [address]    []  [place]    []  Going Back to Work - [facility]    []  Preventing Engorgement - [facility]    Supplies given:    []  Brush, soap and basin for breastpump cleaning    []  Insurance pump provided     []  [facility] pump set up for patient to use    Explained to patient, patient verbalizes understanding.         Signed:  Ady Christian RN, BSN, IBCLC

## 2022-09-23 NOTE — ANESTHESIA POSTPROCEDURE EVALUATION
Department of Anesthesiology  Postprocedure Note    Patient: Dallis Harada  MRN: [id number]  YOB: 2000  Date of evaluation: 9/23/2022      Procedure Summary     Date: 09/22/22 Room / Location:     Anesthesia Start: 1215 Anesthesia Stop: 2047    Procedure: Labor Analgesia Diagnosis:     Scheduled Providers:  Responsible Provider: GORDO Evans CRNA    Anesthesia Type: epidural ASA Status: 2          Anesthesia Type: No value filed.     Jessica Phase I:      Jessica Phase II:        Anesthesia Post Evaluation    Patient location during evaluation: bedside  Patient participation: complete - patient participated  Level of consciousness: awake and alert  Airway patency: patent  Nausea & Vomiting: no nausea and no vomiting  Complications: no  Cardiovascular status: hemodynamically stable  Respiratory status: acceptable and room air  Hydration status: stable

## 2022-09-23 NOTE — L&D DELIVERY NOTE
Mother's Information      Labor Events     Labor?: No  Cervical Ripening:   Now               Mahl, Baby Girl Kris [[id number]]      Labor Events     Labor?: No  Cervical Ripening Date/Time:     Antibiotics Received during Labor?: No  Rupture Date/Time: 22 07:33:00   Rupture Type: AROM, Intact  Fluid Color: Clear  Fluid Odor: None  Fluid Volume: Large  Induction: Oxytocin, AROM  Labor Complications: Meconium at birth       Anesthesia    Method: Epidural       Start Pushing      Labor onset date/time: 22 15:56:00 Now     Dilation complete date/time: 22 20:10:00 Now     Start pushing date/time: 2022 20:10:00   Decision date/time (emergent ):           Delivery (Niland)      Delivery Date/Time:  22 20:47:00   Delivery Type: Vaginal, Spontaneous    Details:             Presentation    Presentation: Vertex  Position: Middle  _: Occiput  _: Anterior       Shoulder Dystocia    Shoulder Dystocia Present?: No  Add Second Maneuver  Add Third Maneuver  Add Fourth Maneuver  Add Fifth Maneuver  Add Sixth Maneuver  Add Seventh Maneuver  Add Eighth Maneuver  Add Ninth Maneuver       Assisted Delivery Details    Forceps Attempted?: No  Vacuum Extractor Attempted?: No       Document Additional Attempt         Document Additional Attempt                 Cord    Vessels: 3 Vessels  Complications: None  Delayed Cord Clamping?: Yes  Cord Clamped Date/Time: 2022 20:56:00  Cord Blood Disposition: Lab  Gases Sent?: No       Placenta    Date/Time: 2022 21:03:00  Removal: Spontaneous  Appearance: Intact  Disposition: Discarded       Lacerations    Episiotomy: None  Perineal Lacerations: 1st  Number of Repair Packets: 2       Vaginal Counts    Initial Count Personnel: TASHA Hdz RN  Initial Count Verified By: SHAYNE CAMEJO RN    Sponges Welcome Instruments   Initial Counts Correct Correct Correct   Final Counts Correct Correct Correct   Final Count Personnel: [facility]  Spontaneous Vaginal Delivery Note          Pre-operative Diagnosis:  Principal Problem:    Post-term infant with 40-42 completed weeks of gestation  Active Problems:    Gestational diabetes mellitus (GDM) affecting pregnancy     (normal spontaneous vaginal delivery)  Resolved Problems:    * No resolved hospital problems. *      Post-operative Diagnosis:  Living  infant(s) and Female    Blood Type and Rh: O POSITIVE      Condition:  infant stable and mother stable remain bonding in delivery room      Details of Procedure: The patient is a 23 y.o. female at 44w3d   OB History          1    Para        Term                AB        Living             SAB        IAB        Ectopic        Molar        Multiple        Live Births                 who was admitted for induction. She received the following interventions: ARBOW and IV Pitocin induction She was known to be GBS negative and did not receive antibiotic prophylaxis. The patient progressed well,did receive an epidural, became complete and started to push. After pushing for 40 minutes the fetal head was at the perineum,  the rest of the infant delivered atraumatically, placed on mother abdomen. Nucal Cord was noted. Cord was clamped and cut per father of the baby . The delivery of the placenta was spontaneous. Placenta was inspectedintact. The perineum and vagina were explored and a First degree laceration. Suture used for repair:  Vicryl.  Right labial repaired with 4-0 - sh needle

## 2022-09-24 VITALS
HEIGHT: 60 IN | BODY MASS INDEX: 33.18 KG/M2 | SYSTOLIC BLOOD PRESSURE: 104 MMHG | HEART RATE: 65 BPM | WEIGHT: 169 LBS | RESPIRATION RATE: 16 BRPM | TEMPERATURE: 98.2 F | OXYGEN SATURATION: 99 % | DIASTOLIC BLOOD PRESSURE: 55 MMHG

## 2022-09-24 LAB — GLUCOSE BLD-MCNC: 80 MG/DL (ref 74–100)

## 2022-09-24 PROCEDURE — 99024 POSTOP FOLLOW-UP VISIT: CPT | Performed by: ADVANCED PRACTICE MIDWIFE

## 2022-09-24 PROCEDURE — 82947 ASSAY GLUCOSE BLOOD QUANT: CPT

## 2022-09-24 PROCEDURE — 6370000000 HC RX 637 (ALT 250 FOR IP): Performed by: ADVANCED PRACTICE MIDWIFE

## 2022-09-24 RX ADMIN — IBUPROFEN 800 MG: 800 TABLET, FILM COATED ORAL at 06:00

## 2022-09-24 RX ADMIN — Medication: at 00:54

## 2022-09-24 ASSESSMENT — PAIN SCALES - GENERAL: PAINLEVEL_OUTOF10: 4

## 2022-09-24 ASSESSMENT — PAIN DESCRIPTION - LOCATION: LOCATION: ABDOMEN

## 2022-09-24 ASSESSMENT — PAIN DESCRIPTION - ORIENTATION: ORIENTATION: LOWER

## 2022-09-24 ASSESSMENT — PAIN DESCRIPTION - DESCRIPTORS: DESCRIPTORS: CRAMPING

## 2022-09-24 NOTE — PLAN OF CARE
Problem: Postpartum  Goal: Experiences normal postpartum course  Description:  Postpartum OB-Pregnancy care plan goal which identifies if the mother is experiencing a normal postpartum course  Outcome: Completed  Goal: Appropriate maternal -  bonding  Description:  Postpartum OB-Pregnancy care plan goal which identifies if the mother and  are bonding appropriately  Outcome: Completed  Goal: Establishment of infant feeding pattern  Description:  Postpartum OB-Pregnancy care plan goal which identifies if the mother is establishing a feeding pattern with their   Outcome: Completed  Goal: Incisions, wounds, or drain sites healing without S/S of infection  Outcome: Completed     Problem: Pain  Goal: Verbalizes/displays adequate comfort level or baseline comfort level  Outcome: Completed     Problem: Infection - Adult  Goal: Absence of infection at discharge  Outcome: Completed  Goal: Absence of infection during hospitalization  Outcome: Completed  Goal: Absence of fever/infection during anticipated neutropenic period  Outcome: Completed     Problem: Safety - Adult  Goal: Free from fall injury  Outcome: Completed     Problem: Discharge Planning  Goal: Discharge to home or other facility with appropriate resources  Outcome: Completed     Problem: Chronic Conditions and Co-morbidities  Goal: Patient's chronic conditions and co-morbidity symptoms are monitored and maintained or improved  Outcome: Completed

## 2022-09-24 NOTE — DISCHARGE INSTRUCTIONS
experiencing extreme weakness or dizziness. If you are having flu-like symptoms such as achy muscles or joints. There is a foul smell or a green color to your vaginal bleeding. If you have pain that cannot be relieved. You have persistent burning or frequency with urination. Call if you have concerns about your well-being. You are unable to sleep, eat, or are having thoughts of harming yourself or your baby. You have swelling, bleeding, drainage, foul odor, redness, or warmth in/around your incision or stitches. You have a red, warm, tender area in your calf.

## 2022-09-24 NOTE — FLOWSHEET NOTE
1130- Pt discharged home. Discharge instructions reviewed with pt. Copy of instructions given. Pt has f/u appointment scheduled. Verbalized understanding and denied questions. 1200- Pt off unit in stable condition.

## 2022-09-24 NOTE — DISCHARGE SUMMARY
Obstetrical Discharge Form        Gestational Age:40w1d    Antepartum complications: gestational diabetes    Date of Delivery: 22    Type of Delivery:        Delivered By:  Sheng CARO CNM    Assisted By:N/A}      Baby: female    Anesthesia:  epidural      Intrapartum complications: None    Feeding method: breast    Blood type: O POSITIVE      Rubella:    Rubella Antibody, IGG   Date Value Ref Range Status   2022 72.5 IU/mL Final     Comment:                 REFERENCE RANGE:  <5.0       NON-REACTIVE (non-immune)  5.0 TO 9.9 EQUIVOCAL  >=10.0     REACTIVE     (immune)             T. Pallidium, IGG:    T. pallidum, IgG   Date Value Ref Range Status   2022 NONREACTIVE NONREACTIVE Final     Comment:           T. pallidum antibodies are not detected. There is no serological evidence of infection with T. pallidum (early primary syphilis   cannot be excluded). Retest in 2-4 weeks if syphilis is clinically suspect.              Hepatitis B Surface Antigen:   Hepatitis B Surface Ag   Date Value Ref Range Status   2022 NONREACTIVE NONREACTIVE Final     HIV:  No results found for: [id number]    Results for orders placed or performed during the hospital encounter of 22   DRUG SCREEN MULTI URINE   Result Value Ref Range    Amphetamine Screen, Ur NEGATIVE NEGATIVE    Barbiturate Screen, Ur NEGATIVE NEGATIVE    Benzodiazepine Screen, Urine NEGATIVE NEGATIVE    Cocaine Metabolite, Urine NEGATIVE NEGATIVE    Methadone Screen, Urine NEGATIVE NEGATIVE    Opiates, Urine NEGATIVE NEGATIVE    Phencyclidine, Urine NEGATIVE NEGATIVE    Propoxyphene, Urine NEGATIVE NEGATIVE    Cannabinoid Scrn, Ur NEGATIVE NEGATIVE    Oxycodone Screen, Ur NEGATIVE NEGATIVE    Methamphetamine, Urine NEGATIVE NEGATIVE    Tricyclic Antidepressants, Urine NEGATIVE NEGATIVE    Buprenorphine Urine NEGATIVE NEGATIVE   CBC auto differential   Result Value Ref Range    WBC 12.0 (H) 3.5 - 11.3 k/uL    RBC 3.98 3.95 - 5.11 m/uL Hemoglobin 12.8 11.9 - 15.1 g/dL    Hematocrit 37.9 36.3 - 47.1 %    MCV 95.2 82.6 - 102.9 fL    MCH 32.2 25.2 - 33.5 pg    MCHC 33.8 28.4 - 34.8 g/dL    RDW 13.6 11.8 - 14.4 %    Platelets 588 622 - 125 k/uL    MPV 10.7 8.1 - 13.5 fL    NRBC Automated 0.0 0.0 per 100 WBC    Seg Neutrophils 71 (H) 36 - 65 %    Lymphocytes 18 (L) 24 - 43 %    Monocytes 8 3 - 12 %    Eosinophils % 1 1 - 4 %    Basophils 0 0 - 2 %    Immature Granulocytes 2 (H) 0 %    Segs Absolute 8.44 (H) 1.50 - 8.10 k/uL    Absolute Lymph # 2.18 1.10 - 3.70 k/uL    Absolute Mono # 0.97 0.10 - 1.20 k/uL    Absolute Eos # 0.06 0.00 - 0.44 k/uL    Basophils Absolute 0.04 0.00 - 0.20 k/uL    Absolute Immature Granulocyte 0.26 0.00 - 0.30 k/uL   Comprehensive metabolic panel   Result Value Ref Range    Glucose 92 70 - 99 mg/dL    BUN 7 6 - 20 mg/dL    Creatinine 0.41 (L) 0.50 - 0.90 mg/dL    Bun/Cre Ratio 17 9 - 20    Calcium 9.1 8.6 - 10.4 mg/dL    Sodium 133 (L) 135 - 144 mmol/L    Potassium 3.9 3.7 - 5.3 mmol/L    Chloride 100 98 - 107 mmol/L    CO2 20 20 - 31 mmol/L    Anion Gap 13 9 - 17 mmol/L    Alkaline Phosphatase 131 (H) 35 - 104 U/L    ALT 13 5 - 33 U/L    AST 15 <32 U/L    Total Bilirubin 0.3 0.3 - 1.2 mg/dL    Total Protein 6.0 (L) 6.4 - 8.3 g/dL    Albumin 3.5 3.5 - 5.2 g/dL    Albumin/Globulin Ratio 1.4 1.0 - 2.5    GFR Non-African American >60 >60 mL/min    GFR African American >60 >60 mL/min    GFR Comment          GFR Staging         Glucose, Whole Blood   Result Value Ref Range    POC Glucose 78 74 - 100 mg/dL   Glucose, Whole Blood   Result Value Ref Range    POC Glucose 78 74 - 100 mg/dL   Glucose, Whole Blood   Result Value Ref Range    POC Glucose 79 74 - 100 mg/dL   Glucose, Whole Blood   Result Value Ref Range    POC Glucose 79 74 - 100 mg/dL   Glucose, Whole Blood   Result Value Ref Range    POC Glucose 73 (L) 74 - 100 mg/dL   Glucose, Whole Blood   Result Value Ref Range    POC Glucose 69 (L) 74 - 100 mg/dL   Glucose, Whole Blood   Result Value Ref Range    POC Glucose 73 (L) 74 - 100 mg/dL   Glucose, Whole Blood   Result Value Ref Range    POC Glucose 85 74 - 100 mg/dL   Glucose, Whole Blood   Result Value Ref Range    POC Glucose 80 74 - 100 mg/dL         Postpartum complications: none    Discharge Medication:      Medication List        CONTINUE taking these medications      * blood glucose monitor supplies Supplies  1 each by Other route 4 times daily One glucometer, test strips, lancets - per insurance coverage     * True Metrix Meter w/Device Kit  USE METER TO CHECK BLOOD SUGAR FOUR TIMES DAILY     Breast Pump Misc  1 Units by Does not apply route 5 times daily double pump - feeding problem of the  P92.9  Care of the lactating mother Z39.1     Drug Lady Ada Lancets 30G Misc           * This list has 2 medication(s) that are the same as other medications prescribed for you. Read the directions carefully, and ask your doctor or other care provider to review them with you.                 ASK your doctor about these medications      blood glucose test strips  Pt to test four times daily fastin, 2 hr pp     PRENATAL 19 PO                  Admit date: 2022  5:20 AM    Discharge Date: 2022    Discharged to: Home in stable condition        Plan:   Follow up in 6 week(s) for post partum visit

## 2022-09-26 ENCOUNTER — CARE COORDINATION (OUTPATIENT)
Dept: OTHER | Facility: CLINIC | Age: 22
End: 2022-09-26

## 2022-09-26 NOTE — CARE COORDINATION
Call within 2 business days of discharge: Yes     Last Discharge [facility]       Date Complaint Diagnosis Description Type Department Provider    22 Scheduled Induction  Admission (Discharged) [facility] AT THE [facility] L&D Patti Canas, [address] Manager contacted the patient by telephone to discuss the maternity management program.  Patient agrees to care management services at this time. Verified name and  with patient as identifiers. Risk Factors Identified:  None      Needs to be reviewed by the provider   none         Method of communication with provider : none    Advance Care Planning:   Does patient have an Advance Directive:  reviewed and current. Does patient have OB/Gyn Selected? Yes    Discussed follow up appointments. If no appointment was previously scheduled, appointment scheduling offered: Yes  [facility] follow up appointment(s):   Future Appointments   Date Time Provider Tee Giron   11/3/2022  1:10 PM Patti CanasGORDO - [facility]     Non-[facility] follow up appointment(s): n/a    OB History:   OB History    Para Term  AB Living   1 1 1     1   SAB IAB Ectopic Molar Multiple Live Births           0 1      # Outcome Date GA Lbr Augustus/2nd Weight Sex Delivery Anes PTL Lv   1 Term 22 40w1d 04:14 / 00:37 8 lb 7.9 oz (3.852 kg) F Vag-Spont EPI N SYMONE      Complications: Meconium at birth       40w1d    Medication reconciliation was performed with patient, who verbalizes understanding of administration of home medications. Advised obtaining a 90-day supply of all daily and as-needed medications. Barriers/Support system:  patient  Return to work planning? Yes, RTW at 14 weeks PP       Postpartum Assessment:  Vaginal  Lochia-moderate  Fever No  BM?  Yes   Decreased urinary output No  Perineal care-pads and nicholas bottle  Visual changes No  Calf pain No  Headache No  Swelling No  Breast Pain No  Depression or feelings of sadness or overwhelm-None  Breast Feeding Yes  Patient stated delivery experience: patient reports that delivery went very well  Risk factors for ED visit or readmission: None      Patient given an opportunity to ask questions and does not have any further questions or concerns at this time. Were discharge instructions available to patient? Yes. Reviewed appropriate site of care based on symptoms and resources available to patient including: PCP  Specialist  Benefits related nurse triage line  Urgent care clinics  [facility] 24/7  When to call [address] The patient agrees to contact the OB/Gyn office for questions related to their healthcare. Plan for a follow-up call in 2 weeks  based on severity of symptoms and risk factors. Plan for next call: symptom management-any new s/s?  self management-any new concerns?  follow up appointment-how did 2 week PPV go? Goals        Attends follow-up appointments as directed      Educate and encourage importance of FU for prevention of complications or disease;  Assess the patient's relationship with a PCP and next FU visit scheduled; Discuss importance of adherence to treatment plan and follow up visits; Identify any barriers in transportation or access to FU appointments. Assist patient with making FU appointments as needed;   It's also a good idea to know your test results. Keep a list of the medicines you take. Patient will identify signs and symptoms requiring evaluation and intervention      Demonstrate how client is to evaluate contraction activity after discharge (e.g., lying down, tilted to the side with a pillow to the back, placing fingertips on the fundus for approximately 1 hour to note hardening or tightening of the uterus). Identify signs and/or symptoms that should be reported immediately to the healthcare provider (e.g, sustained uterine contractions, clear drainage from vagina, bleeding).   Provide information about follow-up care when client is discharged. Advise client to empty bladder every two (2) hours while awake  Review daily fluid need; avoid coffee. Encourage regular rest periods 2-3 times a day in side-lying position. If bedrest is to be continued after discharge, suggest client spend part of day on couch or recliner. Assist patient to identify any risky behaviors and support change;     Red flags maternity  Call 911 anytime you think you may need emergency care. For example, call if:  Call your doctor now or seek immediate medical care if:     You passed out (lost consciousness). You have a seizure. You have severe vaginal bleeding. You have severe pain in your belly or pelvis. You have had fluid gushing or leaking from your vagina and you know or think the  umbilical cord is bulging into your vagina. If this happens, immediately get down on  your knees so your rear end (buttocks) is higher than your head. This will decrease  the pressure on the cord until help arrives. You have signs of preeclampsia, such as:  Sudden swelling of your face, hands, or feet. New vision problems (such as dimness, blurring, or seeing spots). A severe headache. You have any vaginal bleeding. You have belly pain or cramping. You have a fever. You have had regular contractions (with or without pain) for an hour. This means that  you have 8 or more within 1 hour or 4 or more in 20 minutes after you change your  position and drink fluids. You have a sudden release of fluid from your vagina. You have low back pain or pelvic pressure that does not go away. You notice that your baby has stopped moving or is moving much less than normal.            Summary Note: Today patient states that she is doing well without any chief complaint. Patient is now 4 days PP following a vaginal delivery. Her lochia is moderate. She denies chest pain, shortness of breath, headache, lightheadedness, and blurred vision.  She is breast feeding and she denies any signs or symptoms of mastitis. She is ambulating well with no concerns regarding ADL's. She is voiding without difficulty. She currently denies s/s of postpartum depression. Bowel movement and flatus are present and normal. She is tolerating solids with no concerns related to nutrition noted. Counseling related to secondary smoke risks and Sudden Infant Death Syndrome (SIDS) were reviewed. Infant sleeping, \"back to sleep\" and avoidance of co-sleeping recommendations were reviewed. Signs and symptoms of Post-Partum Depression were reviewed. The patient will call OBGYN provider or ACM if any occur. Signs and symptoms of Mastitis were reviewed. The patient is to call OBGYN provider or ACM if any occur. Discharge instructions reviewed, instructions include pelvic rest, incision care, weight restrictions and driving restrictions (no driving with pain medicine). Patient's PPV is scheduled in 2 weeks for mood check. She denies any red-flag s/s or needs r/t DME, medications or appointments. She is agreeable to a follow-up call with ACM in 2 weeks and will call with any needs in the meantime. RIOS Stevenson, RN  Associate Care Manager   Cell: [phone]  [email or web address]

## 2022-10-10 ENCOUNTER — CARE COORDINATION (OUTPATIENT)
Dept: OTHER | Facility: CLINIC | Age: 22
End: 2022-10-10

## 2022-10-10 ENCOUNTER — TELEPHONE (OUTPATIENT)
Dept: OBGYN | Age: 22
End: 2022-10-10

## 2022-10-10 NOTE — TELEPHONE ENCOUNTER
Post Partum Phone Call Vaginal delivery  Follow Up 2 weeks sheridan after discharge      Date of service: 10/10/2022    Kris Vásquez  Is a 23 y. o. Delivery date 9/22/22     Type of delivery:  Spontaneous vaginal delivery    Laceration:Yes, First degree laceration. Suture used for repair:  Vicryl 4.0    Infant gender: female    Infant name: Jonh    Are you breast or bottle feeding? both    How did first pediatrician visit go: good, had to have a second weight check but doing well. How are you feeling: I'm good! How is your bleeding: a little bit ,but not very much    Any questions or concerns: None. Are you on any medications for depression. no    Information given to pt. EPPDS:   Postpartum Depression Scale 10/10/2022   I have been able to laugh and see the funny side of things As much as I always could   I have looked forward with enjoyment to things As much as I ever did   I have blamed myself unnecessarily when things went wrong No, never   I have been anxious or worried for no good reason No, not at all   I have felt scared or panicky for no good reason No, not at all   I haven't been able to cope lately No, I have been coping as well as ever   I have been so unhappy that I have had difficulty sleeping Not at all   I have felt sad or miserable No, not at all   I have been so unhappy that I have been crying No, never   The thought of harming myself has occurred to me Never   Total 0       If above 10 schedule pt and appointment. Within a few days    Lets schedule your appt now -   Date - 11/3/2022 @ 110pm   What for -     History of thyroid issues? No  If yes order TSH with reflux to have done in lab within a week

## 2022-10-10 NOTE — CARE COORDINATION
ACM attempted to reach patient for PP follow-up call. HIPAA compliant message left requesting a return phone call. Will attempt to outreach patient again. RIOS Crawford, RN  Associate Care Manager   Cell: [phone]  [email or web address]

## 2022-10-19 ENCOUNTER — CARE COORDINATION (OUTPATIENT)
Dept: OTHER | Facility: CLINIC | Age: 22
End: 2022-10-19

## 2022-10-19 NOTE — CARE COORDINATION
Last Discharge  [facility]       Date Complaint Diagnosis Description Type Department Provider    22 Scheduled Induction  Admission (Discharged) [facility] L[facility] Cheri Molina, [address] Manager contacted the patient by telephone to discuss the maternity management program.  Patient agrees to care management services at this time. Verified name and  with patient as identifiers. Risk Factors Identified:  None      Needs to be reviewed by the provider   none         Method of communication with provider : none    Advance Care Planning:   Does patient have an Advance Directive:  reviewed and current. Does patient have OB/Gyn Selected? Yes    Discussed follow up appointments. If no appointment was previously scheduled, appointment scheduling offered: Yes  [facility] follow up appointment(s):   Future Appointments   Date Time Provider Tee Giron   11/3/2022  1:10 PM GORDO Pappas CNM [facility]     Non-[facility] follow up appointment(s): n/a    OB History:   OB History    Para Term  AB Living   1 1 1     1   SAB IAB Ectopic Molar Multiple Live Births           0 1      # Outcome Date GA Lbr Augustus/2nd Weight Sex Delivery Anes PTL Lv   1 Term 22 40w1d 04:14 / 00:37 8 lb 7.9 oz (3.852 kg) F Vag-Spont EPI N SYMONE      Complications: Meconium at birth       40w1d    Medication reconciliation was performed with patient, who verbalizes understanding of administration of home medications. Advised obtaining a 90-day supply of all daily and as-needed medications. Barriers/Support system:  patient and spouse  Return to work planning? Yes, 22      Postpartum Assessment:  Vaginal  Lochia-minimal to none  Fever No  BM?  Yes   Decreased urinary output No  Perineal care-pads  Visual changes No  Calf pain No  Headache No  Swelling No  Breast Pain No  Depression or feelings of sadness or overwhelm-None  Breast Feeding Yes    Patient given an opportunity to ask questions and does not have any further questions or concerns at this time. Were discharge instructions available to patient? Yes. Reviewed appropriate site of care based on symptoms and resources available to patient including: PCP  Specialist  Benefits related nurse triage line  Urgent care clinics  [facility] 24/7  When to call [address] The patient agrees to contact the OB/Gyn office for questions related to their healthcare. Plan for a follow-up call  based on severity of symptoms and risk factors. Plan for next call: symptom management-any new s/s?  self management-any new concerns?  follow up appointment-How did PPV on 11/3/22 go? Goals        Attends follow-up appointments as directed      Educate and encourage importance of FU for prevention of complications or disease;  Assess the patient's relationship with a PCP and next FU visit scheduled; Discuss importance of adherence to treatment plan and follow up visits; Identify any barriers in transportation or access to FU appointments. Assist patient with making FU appointments as needed;   It's also a good idea to know your test results. Keep a list of the medicines you take. Patient will identify signs and symptoms requiring evaluation and intervention      Demonstrate how client is to evaluate contraction activity after discharge (e.g., lying down, tilted to the side with a pillow to the back, placing fingertips on the fundus for approximately 1 hour to note hardening or tightening of the uterus). Identify signs and/or symptoms that should be reported immediately to the healthcare provider (e.g, sustained uterine contractions, clear drainage from vagina, bleeding). Provide information about follow-up care when client is discharged. Advise client to empty bladder every two (2) hours while awake  Review daily fluid need; avoid coffee.   Encourage regular rest periods 2-3 times a day in side-lying position. If bedrest is to be continued after discharge, suggest client spend part of day on couch or recliner. Assist patient to identify any risky behaviors and support change;     Red flags maternity  Call 911 anytime you think you may need emergency care. For example, call if:  Call your doctor now or seek immediate medical care if:     You passed out (lost consciousness). You have a seizure. You have severe vaginal bleeding. You have severe pain in your belly or pelvis. You have had fluid gushing or leaking from your vagina and you know or think the  umbilical cord is bulging into your vagina. If this happens, immediately get down on  your knees so your rear end (buttocks) is higher than your head. This will decrease  the pressure on the cord until help arrives. You have signs of preeclampsia, such as:  Sudden swelling of your face, hands, or feet. New vision problems (such as dimness, blurring, or seeing spots). A severe headache. You have any vaginal bleeding. You have belly pain or cramping. You have a fever. You have had regular contractions (with or without pain) for an hour. This means that  you have 8 or more within 1 hour or 4 or more in 20 minutes after you change your  position and drink fluids. You have a sudden release of fluid from your vagina. You have low back pain or pelvic pressure that does not go away. You notice that your baby has stopped moving or is moving much less than normal.            Summary Note: Today patient states that she is doing well without any chief complaint. Patient is now 4 weeks PP following a vaginal delivery. Her lochia is minimal. She denies chest pain, shortness of breath, headache, lightheadedness, and blurred vision. She is breast feeding and she denies any signs or symptoms of mastitis. She is ambulating well with no concerns regarding ADL's. She is voiding without difficulty. She currently denies s/s of postpartum depression.  Bowel movement and flatus are present and normal. She is tolerating solids with no concerns related to nutrition noted. Counseling related to secondary smoke risks and Sudden Infant Death Syndrome (SIDS) were reviewed. Infant sleeping, \"back to sleep\" and avoidance of co-sleeping recommendations were reviewed. Signs and symptoms of Post-Partum Depression were reviewed. The patient will call OBGYN provider or ACM if any occur. Signs and symptoms of Mastitis were reviewed. The patient is to call OBGYN provider or ACM if any occur. Discharge instructions reviewed, instructions include pelvic rest, incision care, weight restrictions and driving restrictions (no driving with pain medicine). Patient's PPV is scheduled on 11/3/22. She denies any red-flag s/s or needs r/t DME, medications or appointments. She is agreeable to a follow-up call with ACM in 4 weeks and will call with any needs in the meantime. MORIS VictoriaN, RN  Associate Care Manager   Cell: [phone]  [email or web address]

## 2022-11-03 ENCOUNTER — POSTPARTUM VISIT (OUTPATIENT)
Dept: OBGYN | Age: 22
End: 2022-11-03

## 2022-11-03 VITALS
BODY MASS INDEX: 29.84 KG/M2 | DIASTOLIC BLOOD PRESSURE: 80 MMHG | SYSTOLIC BLOOD PRESSURE: 120 MMHG | HEART RATE: 76 BPM | HEIGHT: 60 IN | WEIGHT: 152 LBS

## 2022-11-03 DIAGNOSIS — Z86.32 HX OF GESTATIONAL DIABETES IN PRIOR PREGNANCY, CURRENTLY PREGNANT, THIRD TRIMESTER: ICD-10-CM

## 2022-11-03 DIAGNOSIS — O09.293 HX OF GESTATIONAL DIABETES IN PRIOR PREGNANCY, CURRENTLY PREGNANT, THIRD TRIMESTER: ICD-10-CM

## 2022-11-03 PROCEDURE — 0503F POSTPARTUM CARE VISIT: CPT | Performed by: ADVANCED PRACTICE MIDWIFE

## 2022-11-03 NOTE — PROGRESS NOTES
POSTPARTUM EXAM    Date of service: 11/3/2022    Kris Vásquez  Is a 23 y.o.  female    PT's PCP is: Mile Marquis DO     : 2000     OB History    Para Term  AB Living   1 1 1     1   SAB IAB Ectopic Molar Multiple Live Births           0 1      # Outcome Date GA Lbr Augustus/2nd Weight Sex Delivery Anes PTL Lv   1 Term 22 40w1d 04:14 / 00:37 8 lb 7.9 oz (3.852 kg) F Vag-Spont EPI N SYMONE      Complications: Meconium at birth        Social History     Tobacco Use   Smoking Status Never   Smokeless Tobacco Never         Social History     Substance and Sexual Activity   Alcohol Use Not Currently         Delivery date 22    Type of delivery:  Spontaneous vaginal delivery    Laceration:Yes, First degree laceration. Suture used for repair:  Vicryl 3.0 right labial repaired with 4.0    Infant gender: female    Infant name: Jonh    Are you breast or bottle feeding? Breast milk and formula     Have you been sexually active since delivery? No    Vital Signs: Blood pressure 120/80, pulse 76, height 5' (1.524 m), weight 152 lb (68.9 kg), last menstrual period 12/15/2021, currently breastfeeding. Labs:    Blood Type and Rh: O POSITIVE          Allergies: Patient has no known allergies. Current Outpatient Medications:     Misc.  Devices (BREAST PUMP) MISC, 1 Units by Does not apply route 5 times daily double pump - feeding problem of the  P92.9 Care of the lactating mother Z39.1, Disp: 1 each, Rfl: 0    Prenatal Vit-Fe Fumarate-FA (PRENATAL 19 PO), Take by mouth, Disp: , Rfl:     Blood Glucose Monitoring Suppl (TRUE METRIX METER) w/Device KIT, USE METER TO CHECK BLOOD SUGAR FOUR TIMES DAILY (Patient not taking: No sig reported), Disp: 1 kit, Rfl: 1    Drug [employer] Unilet Lancets 30G MISC, USE LANCET TO TEST BLOOD SUGAR FOUR TIMES DAILY (Patient not taking: No sig reported), Disp: , Rfl:     blood glucose monitor supplies, 1 each by Other route 4 times daily One glucometer, test strips, lancets - per insurance coverage (Patient not taking: No sig reported), Disp: 150 each, Rfl: 4    Last Yearly:  2022    Last pap: 02/08/22    Last HPV: never     Chief Complaint   Patient presents with    Postpartum Care     6 week pp care following vaginal delivery on 09/22/22, last pap 02/08/22. Pt is currently using both breast milk and formula, pt has not been sexually active since delivery, pt would like to discuss cycle control options. Pt states no issues or concerns. Pt declines having her iron checked        How many Hours of sleep do you get a night: about 8 or 9 hours     Do you have a normal appetite: yes     Any problems or pain: no    Do you feel like you coping well: yes     Is baby sleeping:yes     How is baby eating: good about 3 to 4 oz     How did first pediatrician visit go: good     EPPDS: 0  Postpartum Depression Scale 11/3/2022   I have been able to laugh and see the funny side of things As much as I always could   I have looked forward with enjoyment to things As much as I ever did   I have blamed myself unnecessarily when things went wrong No, never   I have been anxious or worried for no good reason No, not at all   I have felt scared or panicky for no good reason No, not at all   I haven't been able to cope lately No, I have been coping as well as ever   I have been so unhappy that I have had difficulty sleeping Not at all   I have felt sad or miserable No, not at all   I have been so unhappy that I have been crying No, never   The thought of harming myself has occurred to me Never   Total 0         No results found for this visit on 11/03/22. History of gestational diabetes? Yes  If yes order 2 hr GTT    Nurse: NW    HPI:  PT presents for Post partum exam Follow up in 6 week(s) after delivery. Is requesting a ParaGard. We did review inability to get ParaGard through the [facility] facility. Objective        GI: Abdomen soft, non-tender.  BS normal. No masses, No organomegaly           Extremities: normal strength, tone, and muscle mass                                 Assessment and Plan          Diagnosis Orders   1. Postpartum care following vaginal delivery  Glucose Tolerance, 2 Hrs      2. Hx of gestational diabetes in prior pregnancy, currently pregnant, third trimester  Glucose Tolerance, 2 Hrs                I am having Kris Vásquez maintain her Prenatal Vit-Fe Fumarate-FA (PRENATAL 19 PO), blood glucose monitor supplies, Drug [employer] Unilet Lancets 30G, True Metrix Meter, and Breast Pump. Return in about 3 months (around 2/14/2023) for yearly. She was also counseled on her preventative health maintenance recommendations and follow-up. There are no Patient Instructions on file for this visit.     Stephanie Mendoza, GORDO Dinh CNM,11/3/2022 1:26 PM

## 2022-11-16 ENCOUNTER — CARE COORDINATION (OUTPATIENT)
Dept: OTHER | Facility: CLINIC | Age: 22
End: 2022-11-16

## 2022-11-16 NOTE — CARE COORDINATION
Last Discharge  [facility]       Date Complaint Diagnosis Description Type Department Provider    22 Scheduled Induction  Admission (Discharged) [facility] L[facility]D Sunil Moody, [address] Manager contacted the patient by telephone to discuss the maternity management program.  Patient agrees to care management services at this time. Verified name and  with patient as identifiers. Risk Factors Identified:  None      Needs to be reviewed by the provider   none         Method of communication with provider : none    Advance Care Planning:   Does patient have an Advance Directive:  reviewed and current. Does patient have OB/Gyn Selected? Yes    Discussed follow up appointments. If no appointment was previously scheduled, appointment scheduling offered: Yes  [facility] follow up appointment(s):   Future Appointments   Date Time Provider Tee Solisi   2023  1:40 PM GORDO Fischer CNM OB/GYN [facility]     Non-[facility] follow up appointment(s): n/a    OB History:   OB History    Para Term  AB Living   1 1 1     1   SAB IAB Ectopic Molar Multiple Live Births           0 1      # Outcome Date GA Lbr Augustus/2nd Weight Sex Delivery Anes PTL Lv   1 Term 22 40w1d 04:14 / 00:37 8 lb 7.9 oz (3.852 kg) F Vag-Spont EPI N SYMONE      Complications: Meconium at birth       Unknown    Medication reconciliation was performed with patient, who verbalizes understanding of administration of home medications. Advised obtaining a 90-day supply of all daily and as-needed medications. Barriers/Support system:  patient and spouse  Return to work planning? Yes, RTW at 16 weeks PP     Postpartum Assessment:  Vaginal  Lochia-resolved  Fever No  BM?  Yes   Decreased urinary output No  Perineal care-none at this time  Visual changes No  Calf pain No  Headache No  Swelling No  Breast Pain No  Depression or feelings of sadness or overwhelm-None  Breast Feeding Yes      Patient given an opportunity to ask questions and does not have any further questions or concerns at this time. Were discharge instructions available to patient? Yes. Reviewed appropriate site of care based on symptoms and resources available to patient including: PCP  Specialist  Benefits related nurse triage line  Urgent care clinics  [facility] 24/7  When to call [address] The patient agrees to contact the OB/Gyn office for questions related to their healthcare. Plan for a follow-up call in 4 weeks  based on severity of symptoms and risk factors. Plan for next call: symptom management-any new s/s?   self management-any new concerns?  follow up appointment-any more PPV's? Goals        Attends follow-up appointments as directed      Educate and encourage importance of FU for prevention of complications or disease;  Assess the patient's relationship with a PCP and next FU visit scheduled; Discuss importance of adherence to treatment plan and follow up visits; Identify any barriers in transportation or access to FU appointments. Assist patient with making FU appointments as needed;   It's also a good idea to know your test results. Keep a list of the medicines you take. Patient will identify signs and symptoms requiring evaluation and intervention      Demonstrate how client is to evaluate contraction activity after discharge (e.g., lying down, tilted to the side with a pillow to the back, placing fingertips on the fundus for approximately 1 hour to note hardening or tightening of the uterus). Identify signs and/or symptoms that should be reported immediately to the healthcare provider (e.g, sustained uterine contractions, clear drainage from vagina, bleeding). Provide information about follow-up care when client is discharged. Advise client to empty bladder every two (2) hours while awake  Review daily fluid need; avoid coffee.   Encourage regular rest periods 2-3 times a day in side-lying position. If bedrest is to be continued after discharge, suggest client spend part of day on couch or recliner. Assist patient to identify any risky behaviors and support change;     Red flags maternity  Call 911 anytime you think you may need emergency care. For example, call if:  Call your doctor now or seek immediate medical care if:     You passed out (lost consciousness). You have a seizure. You have severe vaginal bleeding. You have severe pain in your belly or pelvis. You have had fluid gushing or leaking from your vagina and you know or think the  umbilical cord is bulging into your vagina. If this happens, immediately get down on  your knees so your rear end (buttocks) is higher than your head. This will decrease  the pressure on the cord until help arrives. You have signs of preeclampsia, such as:  Sudden swelling of your face, hands, or feet. New vision problems (such as dimness, blurring, or seeing spots). A severe headache. You have any vaginal bleeding. You have belly pain or cramping. You have a fever. You have had regular contractions (with or without pain) for an hour. This means that  you have 8 or more within 1 hour or 4 or more in 20 minutes after you change your  position and drink fluids. You have a sudden release of fluid from your vagina. You have low back pain or pelvic pressure that does not go away. You notice that your baby has stopped moving or is moving much less than normal.            Summary Note: Today patient states that she is doing well without any chief complaint. Patient is now 7.5 weeks PP following a vaginal delivery. Her lochia is resolved. She denies chest pain, shortness of breath, headache, lightheadedness, and blurred vision. She is breast feeding and she denies any signs or symptoms of mastitis. She is ambulating well with no concerns regarding ADL's. She is voiding without difficulty. She currently denies s/s of postpartum depression. Bowel movement and flatus are present and normal. She is tolerating solids with no concerns related to nutrition noted. Counseling related to secondary smoke risks and Sudden Infant Death Syndrome (SIDS) were reviewed. Infant sleeping, \"back to sleep\" and avoidance of co-sleeping recommendations were reviewed. Signs and symptoms of Post-Partum Depression were reviewed. The patient will call OBGYN provider or ACM if any occur. Signs and symptoms of Mastitis were reviewed. The patient is to call OBGYN provider or ACM if any occur. Discharge instructions reviewed, instructions include pelvic rest, weight restrictions and driving restrictions (no driving with pain medicine). Patient's PPV was attended at 6 weeks PP and no further appointments are needed. She denies any red-flag s/s or needs r/t DME, medications or appointments. She is agreeable to a follow-up call with ACM in 4 weeks and will call with any needs in the meantime. RIOS Dela Cruz, RN  Associate Care Manager   Cell: [phone]  [email or web address]

## 2022-12-02 ENCOUNTER — HOSPITAL ENCOUNTER (OUTPATIENT)
Age: 22
Discharge: HOME | End: 2022-12-02
Payer: COMMERCIAL

## 2022-12-02 DIAGNOSIS — Z86.32 HX OF GESTATIONAL DIABETES IN PRIOR PREGNANCY, CURRENTLY PREGNANT, THIRD TRIMESTER: ICD-10-CM

## 2022-12-02 DIAGNOSIS — O09.293 HX OF GESTATIONAL DIABETES IN PRIOR PREGNANCY, CURRENTLY PREGNANT, THIRD TRIMESTER: ICD-10-CM

## 2022-12-02 LAB
AMOUNT GLUCOSE GIVEN: 75 G
GLUCOSE BLD-MCNC: 91 MG/DL (ref 65–99)
GLUCOSE FASTING: 94 MG/DL (ref 65–99)
GLUCOSE TOLERANCE TEST 1 HOUR: 120 MG/DL (ref 65–184)
GLUCOSE TOLERANCE TEST 2 HOUR: 109 MG/DL (ref 65–139)

## 2022-12-02 PROCEDURE — 36415 COLL VENOUS BLD VENIPUNCTURE: CPT

## 2022-12-02 PROCEDURE — 82947 ASSAY GLUCOSE BLOOD QUANT: CPT

## 2022-12-02 PROCEDURE — 82951 GLUCOSE TOLERANCE TEST (GTT): CPT

## 2023-01-05 ENCOUNTER — CARE COORDINATION (OUTPATIENT)
Dept: OTHER | Facility: CLINIC | Age: 23
End: 2023-01-05

## 2023-01-05 NOTE — CARE COORDINATION
ACM attempted to reach patient for PP follow-up call. HIPAA compliant message left requesting a return phone call. Will attempt to outreach patient again. RIOS Pickett, RN  Associate Care Manager   Cell: [phone]  [email or web address]

## 2023-01-30 ENCOUNTER — CARE COORDINATION (OUTPATIENT)
Dept: OTHER | Facility: CLINIC | Age: 23
End: 2023-01-30

## 2023-01-30 NOTE — CARE COORDINATION
ACM attempted 2nd outreach to reach patient for PP follow-up call. HIPAA compliant message left requesting a return phone call at is patient has needs or concerns. Patient is now 4 months PP and had an uncomplicated delivery. She has been UTR during the PP period. ACM will resolve episode at this time. No further outreach scheduled with this ACM, ACM will sign off care team at this time. Episode of Care resolved. Patient has this ACM's contact information if future needs arise. RIOS Foster, RN  Associate Care Manager   Cell: [phone]  [email or web address]

## 2023-02-20 ENCOUNTER — OFFICE VISIT (OUTPATIENT)
Dept: OBGYN | Age: 23
End: 2023-02-20
Payer: COMMERCIAL

## 2023-02-20 VITALS
BODY MASS INDEX: 31.22 KG/M2 | HEIGHT: 60 IN | SYSTOLIC BLOOD PRESSURE: 120 MMHG | DIASTOLIC BLOOD PRESSURE: 76 MMHG | WEIGHT: 159 LBS

## 2023-02-20 DIAGNOSIS — Z86.32 HX GESTATIONAL DIABETES: ICD-10-CM

## 2023-02-20 DIAGNOSIS — Z01.419 WOMEN'S ANNUAL ROUTINE GYNECOLOGICAL EXAMINATION: Primary | ICD-10-CM

## 2023-02-20 PROBLEM — O24.419 GESTATIONAL DIABETES MELLITUS (GDM) AFFECTING PREGNANCY: Status: RESOLVED | Noted: 2022-07-14 | Resolved: 2023-02-20

## 2023-02-20 PROCEDURE — 99395 PREV VISIT EST AGE 18-39: CPT | Performed by: ADVANCED PRACTICE MIDWIFE

## 2023-02-20 ASSESSMENT — PATIENT HEALTH QUESTIONNAIRE - PHQ9
SUM OF ALL RESPONSES TO PHQ QUESTIONS 1-9: 0
1. LITTLE INTEREST OR PLEASURE IN DOING THINGS: 0
SUM OF ALL RESPONSES TO PHQ QUESTIONS 1-9: 0
2. FEELING DOWN, DEPRESSED OR HOPELESS: 0
SUM OF ALL RESPONSES TO PHQ9 QUESTIONS 1 & 2: 0

## 2023-02-20 NOTE — PROGRESS NOTES
YEARLY PHYSICAL    Date of service: 2023    Kris Vásquez  Is a 23 y.o.  , female    PT's PCP is: Denisse García DO     : 2000                                             Subjective:       Patient's last menstrual period was 2023 (approximate). Are your menses regular: yes    OB History    Para Term  AB Living   1 1 1     1   SAB IAB Ectopic Molar Multiple Live Births           0 1      # Outcome Date GA Lbr Augustus/2nd Weight Sex Delivery Anes PTL Lv   1 Term 22 40w1d 04:14 / 00:37 8 lb 7.9 oz (3.852 kg) F Vag-Spont EPI N SYMONE      Complications: Meconium at birth        Social History     Tobacco Use   Smoking Status Never   Smokeless Tobacco Never        Social History     Substance and Sexual Activity   Alcohol Use Yes    Comment: social       Family History   Problem Relation Age of Onset    Colon Cancer Paternal Grandfather     No Known Problems Paternal Grandmother     No Known Problems Maternal Grandmother     Heart Disease Maternal Grandfather     Hypertension Father     Diabetes Father     Hypertension Mother     No Known Problems Brother     No Known Problems Brother        Any family history of breast or ovarian cancer: No    Any family history of blood clots: No    Have you had a positive covid test: Yes    Have you had the covid immunization: Yes      Allergies: Patient has no known allergies. No current outpatient medications on file.     Social History     Substance and Sexual Activity   Sexual Activity Not Currently    Partners: Male       Any bleeding or pain with intercourse: No    Last Yearly date:  3/8/22      Last pap date and results: 3/8/2022 normal    Last HPV date and results: never    Last Mammogram date and results: never    Last Dexa scan date and results: never    Last colorectal screen- type:  date  never results never    Do you do self breast exams: Yes    Past Medical History:   Diagnosis Date    Gestational diabetes mellitus (GDM) affecting pregnancy 7/14/2022       History reviewed. No pertinent surgical history. Family History   Problem Relation Age of Onset    Colon Cancer Paternal Grandfather     No Known Problems Paternal Grandmother     No Known Problems Maternal Grandmother     Heart Disease Maternal Grandfather     Hypertension Father     Diabetes Father     Hypertension Mother     No Known Problems Brother     No Known Problems Brother        Chief Complaint   Patient presents with    Annual Exam     Patient here for yearly exam. Last PAP 3/8/2022 normal.  Last HPV never   Last mammogram never. No questions or concerns voiced. PE:  Vital Signs  Blood pressure 120/76, height 5' (1.524 m), weight 159 lb (72.1 kg), last menstrual period 01/28/2023, not currently breastfeeding. Estimated body mass index is 31.05 kg/m² as calculated from the following:    Height as of this encounter: 5' (1.524 m). Weight as of this encounter: 159 lb (72.1 kg). Labs:    No results found for this visit on 02/20/23. PHQ-9 Total Score: 0 (2/20/2023  2:00 PM)    NURSE: GORDO Carolina CNM      HPI: Today for routine well woman exam.  Patient denies needs or concerns at this time    Review of Systems   All other systems reviewed and are negative. Objective  Lymphatic:   no lymphadenopathy  Heent:   negative   Cor: regular rate and rhythm, no murmurs              Pul:clear to auscultation bilaterally- no wheezes, rales or rhonchi, normal air movement, no respiratory distress      GI: Abdomen soft, non-tender. BS normal. No masses,  No organomegaly           Extremities: normal strength, tone, and muscle mass   Breasts: Breast:normal appearance, no masses or tenderness   Pelvic Exam: GENITAL/URINARY:  External Genitalia:  General appearance; normal, Hair distribution; normal, Lesions absent  Urethra:   Fullness absent, Masses absent  Bladder:  Fullness absent, Masses absent, Tenderness absent, Cystocele absent  Vagina:  General appearance normal, Estrogen effect normal, Discharge absent, Lesions absent, Pelvic support normal  Cervix:  General appearance normal, Lesions absent, Discharge absent, Tenderness absent, Enlargement absent, Nodularity absent  Uterus:  Size normal, Contour normal, Position normal  Adenexa: Masses absent, Tenderness absent, Enlargement absent                                    Vaginal discharge: no vaginal discharge                              Assessment and Plan          Diagnosis Orders   1. Women's annual routine gynecological examination        2. Hx gestational diabetes                  I have discontinued Kirs Vásquez's Breast Pump. Return in about 1 year (around 2/20/2024) for yearly. She was also counseled on her preventative health maintenance recommendations and follow-up. Patient Instructions   SURVEY:    You may be receiving a survey regarding your visit today. Please complete the survey to enable us to provide the highest quality of care to you and your family. We strive for all 5's - thank you    If you cannot score us a very good (5) on any question, please call the office to discuss this with Myrtle Busby (our ). We would like to discuss how we could of made your experience a very good one. Myrtle Busby: [phone]    Thank you.      GORDO Fischer CNM,2/20/2023 2:17 PM

## 2023-02-20 NOTE — PATIENT INSTRUCTIONS
SURVEY:    You may be receiving a survey regarding your visit today. Please complete the survey to enable us to provide the highest quality of care to you and your family. We strive for all 5's - thank you    If you cannot score us a very good (5) on any question, please call the office to discuss this with Chris Case (our ). We would like to discuss how we could of made your experience a very good one. Chris Case: [phone]    Thank you.

## 2023-03-01 ENCOUNTER — OFFICE VISIT (OUTPATIENT)
Dept: FAMILY MEDICINE CLINIC | Age: 23
End: 2023-03-01
Payer: COMMERCIAL

## 2023-03-01 VITALS
SYSTOLIC BLOOD PRESSURE: 120 MMHG | TEMPERATURE: 97.9 F | OXYGEN SATURATION: 100 % | WEIGHT: 169 LBS | DIASTOLIC BLOOD PRESSURE: 70 MMHG | BODY MASS INDEX: 33.01 KG/M2 | HEART RATE: 90 BPM

## 2023-03-01 DIAGNOSIS — R60.0 SALIVARY GLAND SWELLING: ICD-10-CM

## 2023-03-01 DIAGNOSIS — Z00.00 ENCOUNTER FOR MEDICAL EXAMINATION TO ESTABLISH CARE: ICD-10-CM

## 2023-03-01 DIAGNOSIS — J02.9 SORE THROAT: Primary | ICD-10-CM

## 2023-03-01 DIAGNOSIS — R09.81 NASAL CONGESTION: ICD-10-CM

## 2023-03-01 LAB — S PYO AG THROAT QL: NORMAL

## 2023-03-01 PROCEDURE — 87880 STREP A ASSAY W/OPTIC: CPT | Performed by: STUDENT IN AN ORGANIZED HEALTH CARE EDUCATION/TRAINING PROGRAM

## 2023-03-01 PROCEDURE — 99202 OFFICE O/P NEW SF 15 MIN: CPT | Performed by: STUDENT IN AN ORGANIZED HEALTH CARE EDUCATION/TRAINING PROGRAM

## 2023-03-01 SDOH — ECONOMIC STABILITY: FOOD INSECURITY: WITHIN THE PAST 12 MONTHS, YOU WORRIED THAT YOUR FOOD WOULD RUN OUT BEFORE YOU GOT MONEY TO BUY MORE.: NEVER TRUE

## 2023-03-01 SDOH — ECONOMIC STABILITY: INCOME INSECURITY: HOW HARD IS IT FOR YOU TO PAY FOR THE VERY BASICS LIKE FOOD, HOUSING, MEDICAL CARE, AND HEATING?: NOT HARD AT ALL

## 2023-03-01 SDOH — ECONOMIC STABILITY: FOOD INSECURITY: WITHIN THE PAST 12 MONTHS, THE FOOD YOU BOUGHT JUST DIDN'T LAST AND YOU DIDN'T HAVE MONEY TO GET MORE.: NEVER TRUE

## 2023-03-01 SDOH — ECONOMIC STABILITY: HOUSING INSECURITY
IN THE LAST 12 MONTHS, WAS THERE A TIME WHEN YOU DID NOT HAVE A STEADY PLACE TO SLEEP OR SLEPT IN A SHELTER (INCLUDING NOW)?: NO

## 2023-03-01 ASSESSMENT — ENCOUNTER SYMPTOMS
RHINORRHEA: 0
SHORTNESS OF BREATH: 0
NAUSEA: 0
SINUS PAIN: 0
BACK PAIN: 0
ABDOMINAL PAIN: 0
VOMITING: 0
DIARRHEA: 0
COUGH: 0
WHEEZING: 0
SORE THROAT: 1

## 2023-03-01 NOTE — PROGRESS NOTES
HPI Notes    Name: Karina Vásquez  : 2000         Chief Complaint:     Chief Complaint   Patient presents with    URI     Patient had congestion that started 5 days ago. Yesterday sore throat started       History of Present Illness:      HPI    This is a very nice 57-year-old young woman presenting to establish care with a new primary care provider, but specifically to address sore throat, right-sided neck discomfort. She has been ill with symptoms of nasal congestion for about a week, but more recently over the past 1 to 2 days has developed the sore throat, right-sided neck discomfort. She denies cough, fever, chills, myalgias, nausea, vomiting, diarrhea, rash. 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 She otherwise has no active chronic medical problems and no past surgical history, and was in her typical state of health prior to this illness. Past Medical History:     Past Medical History:   Diagnosis Date    Gestational diabetes mellitus (GDM) affecting pregnancy 2022      Reviewed all health maintenance requirements and ordered appropriate tests  Health Maintenance Due   Topic Date Due    Varicella vaccine (2 of 2 - 2-dose childhood series) 2003    HPV vaccine (1 - 2-dose series) Never done    COVID-19 Vaccine (2 - Booster for Jojo series) 2021    Flu vaccine (1) 2022    [address] screen  2023       Past Surgical History:     No past surgical history on file. Medications:       Prior to Admission medications    Not on File        Allergies:       Patient has no known allergies. Social History:     Tobacco:    reports that she has never smoked. She has never used smokeless tobacco.  Alcohol:      reports current alcohol use. Drug Use:  reports that she does not currently use drugs.     Family History:     Family History   Problem Relation Age of Onset    Colon Cancer Paternal Grandfather     No Known Problems Paternal Grandmother     No Known Problems Maternal Grandmother     Heart Disease Maternal Grandfather     Hypertension Father     Diabetes Father     Hypertension Mother     No Known Problems Brother     No Known Problems Brother        Review of Systems:     Review of Systems   Constitutional:  Negative for fever. HENT:  Positive for congestion and sore throat. Negative for rhinorrhea, sinus pain and sneezing. Respiratory:  Negative for cough, shortness of breath and wheezing. Cardiovascular:  Negative for chest pain. Gastrointestinal:  Negative for abdominal pain, diarrhea, nausea and vomiting. Musculoskeletal:  Negative for back pain. Skin:  Negative for rash. Neurological:  Negative for headaches. Psychiatric/Behavioral:  Negative for sleep disturbance. Physical Exam:     Vitals:  /70   Pulse 90   Temp 97.9 °F (36.6 °C)   Wt 169 lb (76.7 kg)   SpO2 100%   BMI 33.01 kg/m²       Physical Exam  Vitals and nursing note reviewed. Constitutional:       General: She is not in acute distress. Appearance: Normal appearance. HENT:      Right Ear: Tympanic membrane, ear canal and external ear normal. There is no impacted cerumen. Left Ear: Tympanic membrane, ear canal and external ear normal. There is no impacted cerumen. Nose: Nose normal.      Mouth/Throat:      Mouth: Mucous membranes are moist.      Pharynx: Oropharynx is clear. No oropharyngeal exudate or posterior oropharyngeal erythema. Eyes:      Conjunctiva/sclera: Conjunctivae normal.      Pupils: Pupils are equal, round, and reactive to light. Neck:      Comments: Mild swelling and tenderness of right submandibular gland  Cardiovascular:      Rate and Rhythm: Normal rate and regular rhythm. Pulses: Normal pulses. Heart sounds: Normal heart sounds. No murmur heard. Pulmonary:      Effort: Pulmonary effort is normal.      Breath sounds: Normal breath sounds. No wheezing.    Abdominal:      General: Abdomen is flat. Palpations: Abdomen is soft. Tenderness: There is no abdominal tenderness. Musculoskeletal:      Cervical back: Normal range of motion and neck supple. Tenderness present. Lymphadenopathy:      Cervical: No cervical adenopathy. Skin:     General: Skin is warm and dry. Capillary Refill: Capillary refill takes less than 2 seconds. Neurological:      General: No focal deficit present. Mental Status: She is alert and oriented to person, place, and time. Mental status is at baseline. Psychiatric:         Mood and Affect: Mood normal.         Behavior: Behavior normal.       Data:     Lab Results   Component Value Date/Time     09/22/2022 06:06 AM    K 3.9 09/22/2022 06:06 AM     09/22/2022 06:06 AM    CO2 20 09/22/2022 06:06 AM    BUN 7 09/22/2022 06:06 AM    CREATININE 0.41 09/22/2022 06:06 AM    GLUCOSE 92 09/22/2022 06:06 AM    PROT 6.0 09/22/2022 06:06 AM    LABALBU 3.5 09/22/2022 06:06 AM    BILITOT 0.3 09/22/2022 06:06 AM    ALKPHOS 131 09/22/2022 06:06 AM    AST 15 09/22/2022 06:06 AM    ALT 13 09/22/2022 06:06 AM     Lab Results   Component Value Date/Time    WBC 12.0 09/22/2022 06:06 AM    RBC 3.98 09/22/2022 06:06 AM    HGB 12.8 09/22/2022 06:06 AM    HCT 37.9 09/22/2022 06:06 AM    MCV 95.2 09/22/2022 06:06 AM    MCH 32.2 09/22/2022 06:06 AM    MCHC 33.8 09/22/2022 06:06 AM    RDW 13.6 09/22/2022 06:06 AM     09/22/2022 06:06 AM    MPV 10.7 09/22/2022 06:06 AM     No results found for: TSH  Lab Results   Component Value Date/Time    CHOL 229 06/22/2022 06:34 AM    HDL 62 06/22/2022 06:34 AM          Assessment & Plan        Diagnosis Orders   1. Sore throat  POCT rapid strep A      2. Nasal congestion        3. Salivary gland swelling        4. Encounter for medical examination to establish care            We will obtain point-of-care rapid group a strep pharyngitis screen.  Ddx includes GAS, salivary gland obstruction/infection (favoring viral d/t lack of blood or purulence in saliva). Point-of-care group A strep is negative, salivary gland obstruction favored. We discussed symptomatic management including sucking on hard candies, such as lemon drops, over-the-counter pain relief. Follow up in 1 year for AWV. Completed Refills   Requested Prescriptions      No prescriptions requested or ordered in this encounter     Return in about 1 year (around 3/1/2024) for Well Visit. No orders of the defined types were placed in this encounter. Orders Placed This Encounter   Procedures    POCT rapid strep A         There are no Patient Instructions on file for this visit.     Electronically signed by Angelique Vaughan DO on 3/1/2023 at 11:15 AM           Completed Refills   Requested Prescriptions      No prescriptions requested or ordered in this encounter

## 2023-03-02 ENCOUNTER — PATIENT MESSAGE (OUTPATIENT)
Dept: FAMILY MEDICINE CLINIC | Age: 23
End: 2023-03-02

## 2023-03-02 DIAGNOSIS — K11.20 SALIVARY GLAND INFECTION: Primary | ICD-10-CM

## 2023-03-02 NOTE — TELEPHONE ENCOUNTER
From: Kris Vásquez  To: Dr. Franchesca Church  Sent: 3/2/2023 3:36 PM EST  Subject: antibiotic prescription maybe?    moises olivier!  i saw you yesterday in office and we talked about my neck pain/throat pain. while the throat pain is better, the neck seems to have gotten worse and moved up my jaw line towards my ear. i noticed just a little bit ago some yellow drainage in my mouth which makes me question if it is an infection and if an antibiotic prescription would be beneficial! my pharmacy is [facility] in [place], feel free to call me if needed! thanks so much!   Kris

## 2023-03-03 RX ORDER — AMOXICILLIN AND CLAVULANATE POTASSIUM 875; 125 MG/1; MG/1
1 TABLET, FILM COATED ORAL 2 TIMES DAILY
Qty: 14 TABLET | Refills: 0 | Status: SHIPPED | OUTPATIENT
Start: 2023-03-03 | End: 2023-03-10

## 2023-07-14 LAB
CHOLEST SERPL-MCNC: 169 MG/DL
CHOLESTEROL/HDL RATIO: 3.4
GLUCOSE SERPL-MCNC: 91 MG/DL (ref 70–99)
HDLC SERPL-MCNC: 49 MG/DL
LDLC SERPL CALC-MCNC: 100 MG/DL (ref 0–130)
PATIENT FASTING?: YES
TRIGL SERPL-MCNC: 100 MG/DL

## 2023-08-28 ENCOUNTER — HOSPITAL ENCOUNTER (OUTPATIENT)
Age: 23
Setting detail: SPECIMEN
Discharge: HOME | End: 2023-08-28
Payer: COMMERCIAL

## 2023-08-28 ENCOUNTER — OFFICE VISIT (OUTPATIENT)
Dept: FAMILY MEDICINE CLINIC | Age: 23
End: 2023-08-28
Payer: COMMERCIAL

## 2023-08-28 VITALS
SYSTOLIC BLOOD PRESSURE: 110 MMHG | BODY MASS INDEX: 31.25 KG/M2 | TEMPERATURE: 98.3 F | HEART RATE: 95 BPM | WEIGHT: 160 LBS | DIASTOLIC BLOOD PRESSURE: 80 MMHG | OXYGEN SATURATION: 99 %

## 2023-08-28 DIAGNOSIS — B97.89 VIRAL SORE THROAT: ICD-10-CM

## 2023-08-28 DIAGNOSIS — J02.9 SORE THROAT: Primary | ICD-10-CM

## 2023-08-28 DIAGNOSIS — J02.8 VIRAL SORE THROAT: ICD-10-CM

## 2023-08-28 LAB — S PYO AG THROAT QL: NORMAL

## 2023-08-28 PROCEDURE — 87651 STREP A DNA AMP PROBE: CPT

## 2023-08-28 PROCEDURE — 87880 STREP A ASSAY W/OPTIC: CPT | Performed by: STUDENT IN AN ORGANIZED HEALTH CARE EDUCATION/TRAINING PROGRAM

## 2023-08-28 PROCEDURE — 99213 OFFICE O/P EST LOW 20 MIN: CPT | Performed by: STUDENT IN AN ORGANIZED HEALTH CARE EDUCATION/TRAINING PROGRAM

## 2023-08-28 ASSESSMENT — ENCOUNTER SYMPTOMS
NAUSEA: 0
BACK PAIN: 0
DIARRHEA: 0
SINUS PAIN: 0
VOMITING: 0
SORE THROAT: 1
WHEEZING: 0
ABDOMINAL PAIN: 0
COUGH: 0
RHINORRHEA: 0

## 2023-08-28 NOTE — PATIENT INSTRUCTIONS
SURVEY:    You may be receiving a survey from [employer] regarding your visit today. You may get this in the mail, through your MyChart or in your email. Please complete the survey to enable us to provide the highest quality of care to you and your family. If you cannot score us as very good ( 5 Stars) on any question, please feel free to call the office to discuss how we could have made your experience exceptional.     Thank you.     Clinical Care Team:  DO Mehreen Costa LPN    Triage:  Lincoln Drown, [address] [facility] Team:  Ej Mejia

## 2023-08-28 NOTE — PROGRESS NOTES
HPI Notes    Name: Ryan Vásquez  : 2000         Chief Complaint:     Chief Complaint   Patient presents with    Pharyngitis     Patient presents with sore throat and swollen tonsils. Symptoms started yesterday. No other symptoms at this time. History of Present Illness:        HPI    This is a 78-year-old young woman presenting for evaluation of sore throat, and concern specifically for strep sore throat. Symptoms have been present since 3-day and are accompanied by swollen tonsils, erythematous oropharynx. She denies cough, nasal congestion, fever, nausea, vomiting, diarrhea, sick contacts. Past Medical History:     Past Medical History:   Diagnosis Date    Gestational diabetes mellitus (GDM) affecting pregnancy 2022      Reviewed all health maintenance requirements and ordered appropriate tests  Health Maintenance Due   Topic Date Due    Varicella vaccine (2 of 2 - 2-dose childhood series) 2003    HPV vaccine (1 - 2-dose series) Never done    COVID-19 Vaccine (2 - Booster for Jojo series) 2021    Flu vaccine (1) 2023       Past Surgical History:     No past surgical history on file. Medications:       Prior to Admission medications    Not on File        Allergies:       Patient has no known allergies. Social History:     Tobacco:    reports that she has never smoked. She has never used smokeless tobacco.  Alcohol:      reports current alcohol use. Drug Use:  reports that she does not currently use drugs. Family History:     Family History   Problem Relation Age of Onset    Colon Cancer Paternal Grandfather     No Known Problems Paternal Grandmother     No Known Problems Maternal Grandmother     Heart Disease Maternal Grandfather     Hypertension Father     Diabetes Father     Hypertension Mother     No Known Problems Brother     No Known Problems Brother        Review of Systems:         Review of Systems   Constitutional:  Negative for fever.    HENT:
Home

## 2023-08-29 LAB
MICROORGANISM/AGENT SPEC: NORMAL
SPECIMEN DESCRIPTION: NORMAL

## 2023-10-04 ENCOUNTER — HOSPITAL ENCOUNTER (OUTPATIENT)
Age: 23
Setting detail: SPECIMEN
Discharge: HOME | End: 2023-10-04
Payer: COMMERCIAL

## 2023-10-04 ENCOUNTER — INITIAL PRENATAL (OUTPATIENT)
Dept: OBGYN | Age: 23
End: 2023-10-04

## 2023-10-04 VITALS — WEIGHT: 154 LBS | SYSTOLIC BLOOD PRESSURE: 112 MMHG | DIASTOLIC BLOOD PRESSURE: 68 MMHG | BODY MASS INDEX: 30.08 KG/M2

## 2023-10-04 DIAGNOSIS — N91.2 AMENORRHEA: ICD-10-CM

## 2023-10-04 DIAGNOSIS — Z34.81 ENCOUNTER FOR SUPERVISION OF OTHER NORMAL PREGNANCY IN FIRST TRIMESTER: ICD-10-CM

## 2023-10-04 DIAGNOSIS — Z34.81 ENCOUNTER FOR SUPERVISION OF OTHER NORMAL PREGNANCY IN FIRST TRIMESTER: Primary | ICD-10-CM

## 2023-10-04 DIAGNOSIS — Z86.32 HISTORY OF DIET CONTROLLED GESTATIONAL DIABETES MELLITUS (GDM): ICD-10-CM

## 2023-10-04 DIAGNOSIS — Z32.01 POSITIVE URINE PREGNANCY TEST: ICD-10-CM

## 2023-10-04 LAB
ABO + RH BLD: NORMAL
B-HCG SERPL EIA 3RD IS-ACNC: 5893 MIU/ML
BLOOD GROUP ANTIBODIES SERPL: NEGATIVE

## 2023-10-04 PROCEDURE — 86780 TREPONEMA PALLIDUM: CPT

## 2023-10-04 PROCEDURE — 86900 BLOOD TYPING SEROLOGIC ABO: CPT

## 2023-10-04 PROCEDURE — 83036 HEMOGLOBIN GLYCOSYLATED A1C: CPT

## 2023-10-04 PROCEDURE — 87340 HEPATITIS B SURFACE AG IA: CPT

## 2023-10-04 PROCEDURE — 84702 CHORIONIC GONADOTROPIN TEST: CPT

## 2023-10-04 PROCEDURE — 87491 CHLMYD TRACH DNA AMP PROBE: CPT

## 2023-10-04 PROCEDURE — 86762 RUBELLA ANTIBODY: CPT

## 2023-10-04 PROCEDURE — 87389 HIV-1 AG W/HIV-1&-2 AB AG IA: CPT

## 2023-10-04 PROCEDURE — 87591 N.GONORRHOEAE DNA AMP PROB: CPT

## 2023-10-04 PROCEDURE — 86850 RBC ANTIBODY SCREEN: CPT

## 2023-10-04 PROCEDURE — 87086 URINE CULTURE/COLONY COUNT: CPT

## 2023-10-04 PROCEDURE — 85025 COMPLETE CBC W/AUTO DIFF WBC: CPT

## 2023-10-04 PROCEDURE — 86803 HEPATITIS C AB TEST: CPT

## 2023-10-04 PROCEDURE — 86901 BLOOD TYPING SEROLOGIC RH(D): CPT

## 2023-10-05 LAB
BASOPHILS # BLD: 0.04 K/UL (ref 0–0.2)
BASOPHILS NFR BLD: 1 % (ref 0–2)
CHLAMYDIA DNA UR QL NAA+PROBE: NEGATIVE
EOSINOPHIL # BLD: 0.08 K/UL (ref 0–0.44)
EOSINOPHILS RELATIVE PERCENT: 1 % (ref 1–4)
ERYTHROCYTE [DISTWIDTH] IN BLOOD BY AUTOMATED COUNT: 12 % (ref 11.8–14.4)
EST. AVERAGE GLUCOSE BLD GHB EST-MCNC: 80 MG/DL
HBA1C MFR BLD: 4.4 % (ref 4–6)
HBV SURFACE AG SERPL QL IA: NONREACTIVE
HCT VFR BLD AUTO: 41 % (ref 36.3–47.1)
HCV AB SERPL QL IA: NONREACTIVE
HGB BLD-MCNC: 13.9 G/DL (ref 11.9–15.1)
HIV 1+2 AB+HIV1 P24 AG SERPL QL IA: NONREACTIVE
IMM GRANULOCYTES # BLD AUTO: 0.04 K/UL (ref 0–0.3)
IMM GRANULOCYTES NFR BLD: 1 %
LYMPHOCYTES NFR BLD: 2.67 K/UL (ref 1.1–3.7)
LYMPHOCYTES RELATIVE PERCENT: 31 % (ref 24–43)
MCH RBC QN AUTO: 30.8 PG (ref 25.2–33.5)
MCHC RBC AUTO-ENTMCNC: 33.9 G/DL (ref 28.4–34.8)
MCV RBC AUTO: 90.9 FL (ref 82.6–102.9)
MICROORGANISM SPEC CULT: NORMAL
MONOCYTES NFR BLD: 0.69 K/UL (ref 0.1–1.2)
MONOCYTES NFR BLD: 8 % (ref 3–12)
N GONORRHOEA DNA UR QL NAA+PROBE: NEGATIVE
NEUTROPHILS NFR BLD: 58 % (ref 36–65)
NEUTS SEG NFR BLD: 5 K/UL (ref 1.5–8.1)
NRBC BLD-RTO: 0 PER 100 WBC
PLATELET # BLD AUTO: 394 K/UL (ref 138–453)
PMV BLD AUTO: 10 FL (ref 8.1–13.5)
RBC # BLD AUTO: 4.51 M/UL (ref 3.95–5.11)
RUBV IGG SERPL QL IA: 61.39 IU/ML
SPECIMEN DESCRIPTION: NORMAL
SPECIMEN DESCRIPTION: NORMAL
T PALLIDUM AB SER QL IA: NONREACTIVE
WBC OTHER # BLD: 8.5 K/UL (ref 3.5–11.3)

## 2023-10-06 ENCOUNTER — HOSPITAL ENCOUNTER (OUTPATIENT)
Age: 23
Discharge: HOME | End: 2023-10-06
Payer: COMMERCIAL

## 2023-10-06 DIAGNOSIS — Z34.81 ENCOUNTER FOR SUPERVISION OF OTHER NORMAL PREGNANCY IN FIRST TRIMESTER: ICD-10-CM

## 2023-10-06 DIAGNOSIS — N91.2 AMENORRHEA: ICD-10-CM

## 2023-10-06 LAB — B-HCG SERPL EIA 3RD IS-ACNC: 8213 MIU/ML

## 2023-10-06 PROCEDURE — 84702 CHORIONIC GONADOTROPIN TEST: CPT

## 2023-10-06 PROCEDURE — 36415 COLL VENOUS BLD VENIPUNCTURE: CPT

## 2023-10-08 ENCOUNTER — HOSPITAL ENCOUNTER (OUTPATIENT)
Age: 23
Discharge: HOME | End: 2023-10-08
Payer: COMMERCIAL

## 2023-10-08 LAB — B-HCG SERPL EIA 3RD IS-ACNC: ABNORMAL MIU/ML

## 2023-10-08 PROCEDURE — 84702 CHORIONIC GONADOTROPIN TEST: CPT

## 2023-10-08 PROCEDURE — 36415 COLL VENOUS BLD VENIPUNCTURE: CPT

## 2023-10-12 ENCOUNTER — ROUTINE PRENATAL (OUTPATIENT)
Dept: OBGYN | Age: 23
End: 2023-10-12

## 2023-10-12 VITALS
WEIGHT: 153.8 LBS | SYSTOLIC BLOOD PRESSURE: 124 MMHG | HEART RATE: 71 BPM | BODY MASS INDEX: 30.04 KG/M2 | DIASTOLIC BLOOD PRESSURE: 80 MMHG

## 2023-10-12 DIAGNOSIS — Z36.89 SCREENING, ANTENATAL, FOR FETAL ANATOMIC SURVEY: ICD-10-CM

## 2023-10-12 DIAGNOSIS — Z3A.30 30 WEEKS GESTATION OF PREGNANCY: ICD-10-CM

## 2023-10-12 DIAGNOSIS — Z3A.01 6 WEEKS GESTATION OF PREGNANCY: Primary | ICD-10-CM

## 2023-10-12 DIAGNOSIS — Z3A.20 20 WEEKS GESTATION OF PREGNANCY: ICD-10-CM

## 2023-10-12 DIAGNOSIS — Z36.89 ENCOUNTER FOR ULTRASOUND TO ASSESS FETAL GROWTH: ICD-10-CM

## 2023-11-09 ENCOUNTER — ROUTINE PRENATAL (OUTPATIENT)
Dept: OBGYN | Age: 23
End: 2023-11-09

## 2023-11-09 VITALS — BODY MASS INDEX: 29.29 KG/M2 | WEIGHT: 150 LBS | DIASTOLIC BLOOD PRESSURE: 70 MMHG | SYSTOLIC BLOOD PRESSURE: 116 MMHG

## 2023-11-09 DIAGNOSIS — Z86.32 HX GESTATIONAL DIABETES: ICD-10-CM

## 2023-11-09 DIAGNOSIS — Z34.81 ENCOUNTER FOR SUPERVISION OF OTHER NORMAL PREGNANCY IN FIRST TRIMESTER: Primary | ICD-10-CM

## 2023-11-09 DIAGNOSIS — Z3A.10 10 WEEKS GESTATION OF PREGNANCY: ICD-10-CM

## 2023-11-09 PROCEDURE — 0502F SUBSEQUENT PRENATAL CARE: CPT | Performed by: ADVANCED PRACTICE MIDWIFE

## 2023-11-09 NOTE — PROGRESS NOTES
Pt is here today for routine ob visit, pt had in house usn preformed for [facility] today.

## 2023-12-05 ENCOUNTER — ROUTINE PRENATAL (OUTPATIENT)
Dept: OBGYN | Age: 23
End: 2023-12-05
Payer: COMMERCIAL

## 2023-12-05 VITALS
SYSTOLIC BLOOD PRESSURE: 120 MMHG | BODY MASS INDEX: 29.29 KG/M2 | DIASTOLIC BLOOD PRESSURE: 78 MMHG | HEART RATE: 75 BPM | WEIGHT: 150 LBS

## 2023-12-05 DIAGNOSIS — Z3A.14 14 WEEKS GESTATION OF PREGNANCY: Primary | ICD-10-CM

## 2023-12-05 DIAGNOSIS — Z86.32 HX OF GESTATIONAL DIABETES IN PRIOR PREGNANCY, CURRENTLY PREGNANT, SECOND TRIMESTER: ICD-10-CM

## 2023-12-05 DIAGNOSIS — O09.292 HX OF GESTATIONAL DIABETES IN PRIOR PREGNANCY, CURRENTLY PREGNANT, SECOND TRIMESTER: ICD-10-CM

## 2023-12-05 DIAGNOSIS — R73.09 ABNORMAL GTT (GLUCOSE TOLERANCE TEST): ICD-10-CM

## 2023-12-05 LAB — GLUCOSE 60 MIN, POC: 152

## 2023-12-05 PROCEDURE — 82950 GLUCOSE TEST: CPT | Performed by: ADVANCED PRACTICE MIDWIFE

## 2023-12-05 PROCEDURE — 0502F SUBSEQUENT PRENATAL CARE: CPT | Performed by: ADVANCED PRACTICE MIDWIFE

## 2023-12-06 ENCOUNTER — HOSPITAL ENCOUNTER (OUTPATIENT)
Age: 23
Discharge: HOME | End: 2023-12-06
Payer: COMMERCIAL

## 2023-12-06 DIAGNOSIS — R73.09 ABNORMAL GTT (GLUCOSE TOLERANCE TEST): ICD-10-CM

## 2023-12-06 DIAGNOSIS — Z86.32 HX OF GESTATIONAL DIABETES IN PRIOR PREGNANCY, CURRENTLY PREGNANT, SECOND TRIMESTER: ICD-10-CM

## 2023-12-06 DIAGNOSIS — O09.292 HX OF GESTATIONAL DIABETES IN PRIOR PREGNANCY, CURRENTLY PREGNANT, SECOND TRIMESTER: ICD-10-CM

## 2023-12-06 DIAGNOSIS — Z3A.14 14 WEEKS GESTATION OF PREGNANCY: ICD-10-CM

## 2023-12-06 LAB
AMOUNT GLUCOSE GIVEN: 100 G
GLUCOSE 2H P 100 G GLC PO SERPL-MCNC: 89 MG/DL (ref 65–94)
GLUCOSE 3H P 100 G GLC PO SERPL-MCNC: 126 MG/DL (ref 65–179)
GLUCOSE BLD-MCNC: 84 MG/DL (ref 65–99)
GLUCOSE TOLERANCE TEST 2 HOUR: 101 MG/DL (ref 65–154)
GLUCOSE TOLERANCE TEST 3 HOUR: 77 MG/DL (ref 65–139)

## 2023-12-06 PROCEDURE — 82952 GTT-ADDED SAMPLES: CPT

## 2023-12-06 PROCEDURE — 82951 GLUCOSE TOLERANCE TEST (GTT): CPT

## 2023-12-06 PROCEDURE — 36415 COLL VENOUS BLD VENIPUNCTURE: CPT

## 2023-12-06 PROCEDURE — 82947 ASSAY GLUCOSE BLOOD QUANT: CPT

## 2023-12-28 ENCOUNTER — ROUTINE PRENATAL (OUTPATIENT)
Dept: OBGYN | Age: 23
End: 2023-12-28

## 2023-12-28 VITALS
BODY MASS INDEX: 29.29 KG/M2 | HEART RATE: 69 BPM | WEIGHT: 150 LBS | SYSTOLIC BLOOD PRESSURE: 118 MMHG | DIASTOLIC BLOOD PRESSURE: 78 MMHG

## 2023-12-28 DIAGNOSIS — Z34.83 ENCOUNTER FOR SUPERVISION OF OTHER NORMAL PREGNANCY IN THIRD TRIMESTER: ICD-10-CM

## 2023-12-28 DIAGNOSIS — Z3A.17 17 WEEKS GESTATION OF PREGNANCY: Primary | ICD-10-CM

## 2023-12-28 PROCEDURE — 0502F SUBSEQUENT PRENATAL CARE: CPT | Performed by: ADVANCED PRACTICE MIDWIFE

## 2024-01-09 ENCOUNTER — ROUTINE PRENATAL (OUTPATIENT)
Dept: OBGYN | Age: 24
End: 2024-01-09

## 2024-01-09 VITALS
HEART RATE: 76 BPM | DIASTOLIC BLOOD PRESSURE: 80 MMHG | WEIGHT: 152.2 LBS | BODY MASS INDEX: 29.72 KG/M2 | SYSTOLIC BLOOD PRESSURE: 120 MMHG

## 2024-01-09 DIAGNOSIS — Z3A.19 19 WEEKS GESTATION OF PREGNANCY: Primary | ICD-10-CM

## 2024-01-09 DIAGNOSIS — Z36.89 ENCOUNTER FOR ULTRASOUND TO ASSESS FETAL GROWTH: ICD-10-CM

## 2024-01-09 PROCEDURE — 0502F SUBSEQUENT PRENATAL CARE: CPT | Performed by: ADVANCED PRACTICE MIDWIFE

## 2024-01-09 ASSESSMENT — PATIENT HEALTH QUESTIONNAIRE - PHQ9
1. LITTLE INTEREST OR PLEASURE IN DOING THINGS: 0
SUM OF ALL RESPONSES TO PHQ QUESTIONS 1-9: 0
SUM OF ALL RESPONSES TO PHQ QUESTIONS 1-9: 0
2. FEELING DOWN, DEPRESSED OR HOPELESS: 0
SUM OF ALL RESPONSES TO PHQ9 QUESTIONS 1 & 2: 0
SUM OF ALL RESPONSES TO PHQ QUESTIONS 1-9: 0
SUM OF ALL RESPONSES TO PHQ QUESTIONS 1-9: 0

## 2024-01-09 NOTE — PROGRESS NOTES
Content Version: 13.9  © 0638-4721 [employer].   Care instructions adapted under license by [employer]. If you have questions about a medical condition or this instruction, always ask your healthcare professional. [employer] disclaims any warranty or liability for your use of this information.                      Jovita Griffin, GORDO - TESS,1/9/2024 10:18 AM

## 2024-02-06 ENCOUNTER — ROUTINE PRENATAL (OUTPATIENT)
Dept: OBGYN | Age: 24
End: 2024-02-06

## 2024-02-06 VITALS
DIASTOLIC BLOOD PRESSURE: 62 MMHG | HEART RATE: 90 BPM | WEIGHT: 156 LBS | BODY MASS INDEX: 30.47 KG/M2 | SYSTOLIC BLOOD PRESSURE: 118 MMHG

## 2024-02-06 DIAGNOSIS — O09.292 HX OF GESTATIONAL DIABETES IN PRIOR PREGNANCY, CURRENTLY PREGNANT, SECOND TRIMESTER: ICD-10-CM

## 2024-02-06 DIAGNOSIS — Z3A.23 23 WEEKS GESTATION OF PREGNANCY: Primary | ICD-10-CM

## 2024-02-06 DIAGNOSIS — Z86.32 HX OF GESTATIONAL DIABETES IN PRIOR PREGNANCY, CURRENTLY PREGNANT, SECOND TRIMESTER: ICD-10-CM

## 2024-02-06 PROCEDURE — 0502F SUBSEQUENT PRENATAL CARE: CPT | Performed by: ADVANCED PRACTICE MIDWIFE

## 2024-02-06 NOTE — PROGRESS NOTES
Kris Vásquez is here at 23w0d for:    Chief Complaint   Patient presents with    Routine Prenatal Visit     Pt is here today for routine ob visit.       Estimated Due Date: Estimated Date of Delivery: 24    OB History    Para Term  AB Living   2 1 1     1   SAB IAB Ectopic Molar Multiple Live Births           0 1      # Outcome Date GA Lbr Augustus/2nd Weight Sex Delivery Anes PTL Lv   2 Current            1 Term 22 40w1d 04:14  00:37 3.852 kg (8 lb 7.9 oz) F Vag-Spont EPI N SYMONE      Complications: Meconium at birth, Gestational diabetes        Past Medical History:   Diagnosis Date    Gestational diabetes mellitus     Gestational diabetes mellitus (GDM) affecting pregnancy 2022       No past surgical history on file.    Social History     Tobacco Use   Smoking Status Never   Smokeless Tobacco Never        Social History     Substance and Sexual Activity   Alcohol Use Not Currently               HPI: Patient here today for routine prenatal visit. Denies concerns today. Reports active fetal movement.    Yes PT denies fever, chills, nausea and vomiting       Vitals:  Estimated body mass index is 30.47 kg/m² as calculated from the following:    Height as of 23: 1.524 m (5').    Weight as of this encounter: 70.8 kg (156 lb).  BP: 118/62  Weight - Scale: 70.8 kg (156 lb)  Pulse: 90  Patient Position: Sitting  Albumin: Negative  Glucose: Negative  Fundal Height (cm): 23 cm  Fetal HR: 160  Movement: Present           Abdomen: soft          ASSESSMENT & Plan    Diagnosis Orders   1. 23 weeks gestation of pregnancy        2. Hx of gestational diabetes in prior pregnancy, currently pregnant, second trimester            Discussed with patient plans for glucose tolerance test. Patient would like to try the one hour again.       I am having Kris Vásquez maintain her Prenatal MV-Min-Fe Fum-FA-DHA (PRENATAL 1 PO).    Return for Keep next appt.    There are no Patient Instructions on file for

## 2024-02-29 ENCOUNTER — ROUTINE PRENATAL (OUTPATIENT)
Dept: OBGYN | Age: 24
End: 2024-02-29

## 2024-02-29 VITALS
SYSTOLIC BLOOD PRESSURE: 118 MMHG | WEIGHT: 163 LBS | BODY MASS INDEX: 31.83 KG/M2 | DIASTOLIC BLOOD PRESSURE: 78 MMHG | HEART RATE: 82 BPM

## 2024-02-29 DIAGNOSIS — Z86.32 HX GESTATIONAL DIABETES: ICD-10-CM

## 2024-02-29 DIAGNOSIS — R73.09 ABNORMAL GTT (GLUCOSE TOLERANCE TEST): ICD-10-CM

## 2024-02-29 DIAGNOSIS — O99.810 IMPAIRED GLUCOSE IN PREGNANCY, ANTEPARTUM: ICD-10-CM

## 2024-02-29 DIAGNOSIS — Z3A.26 26 WEEKS GESTATION OF PREGNANCY: Primary | ICD-10-CM

## 2024-02-29 LAB
GLUCOSE 60 MIN, POC: 151
HGB, POC: 11.9

## 2024-02-29 NOTE — PROGRESS NOTES
Kris Vásquez is here at 26w2d for:    Chief Complaint   Patient presents with    Routine Prenatal Visit     GTT, pt states no issues or concerns        Estimated Due Date: Estimated Date of Delivery: 24    OB History    Para Term  AB Living   2 1 1     1   SAB IAB Ectopic Molar Multiple Live Births           0 1      # Outcome Date GA Lbr Augustus/2nd Weight Sex Delivery Anes PTL Lv   2 Current            1 Term 22 40w1d 04:14  00:37 3.852 kg (8 lb 7.9 oz) F Vag-Spont EPI N SYMONE      Complications: Meconium at birth, Gestational diabetes        Past Medical History:   Diagnosis Date    Gestational diabetes mellitus     Gestational diabetes mellitus (GDM) affecting pregnancy 2022       History reviewed. No pertinent surgical history.    Social History     Tobacco Use   Smoking Status Never   Smokeless Tobacco Never        Social History     Substance and Sexual Activity   Alcohol Use Not Currently               HPI: Patient here today for OB visit with glucose tolerance testing.  Patient denies needs or concerns at this    Yes PT denies fever, chills, nausea and vomiting       Vitals:  Estimated body mass index is 31.83 kg/m² as calculated from the following:    Height as of 23: 1.524 m (5').    Weight as of this encounter: 73.9 kg (163 lb).  BP: 118/78  Weight - Scale: 73.9 kg (163 lb)  Pulse: 82  Patient Position: Sitting  Albumin: Negative  Glucose: Negative  Fundal Height (cm): 26 cm  Fetal HR: 158  Movement: Present           Abdomen: soft    Results reviewed today:    Results for orders placed or performed in visit on 24   POCT hemoglobin   Result Value Ref Range    Hemoglobin 11.9    POCT Glucose Tolerance 1 Hr   Result Value Ref Range    Glucose 60 min,             ASSESSMENT & Plan    Diagnosis Orders   1. 26 weeks gestation of pregnancy  POCT hemoglobin    POCT Glucose Tolerance 1 Hr    Glucose Tolerance 3 hours (Gestational)      2. Impaired glucose in

## 2024-03-01 ENCOUNTER — HOSPITAL ENCOUNTER (OUTPATIENT)
Age: 24
Discharge: HOME | End: 2024-03-01
Payer: COMMERCIAL

## 2024-03-01 DIAGNOSIS — Z3A.26 26 WEEKS GESTATION OF PREGNANCY: ICD-10-CM

## 2024-03-01 DIAGNOSIS — R73.09 ABNORMAL GTT (GLUCOSE TOLERANCE TEST): ICD-10-CM

## 2024-03-01 LAB
AMOUNT GLUCOSE GIVEN: 100 G
GLUCOSE 2H P 100 G GLC PO SERPL-MCNC: 85 MG/DL (ref 65–94)
GLUCOSE 3H P 100 G GLC PO SERPL-MCNC: 161 MG/DL (ref 65–179)
GLUCOSE BLD-MCNC: 81 MG/DL (ref 65–99)
GLUCOSE TOLERANCE TEST 2 HOUR: 105 MG/DL (ref 65–154)
GLUCOSE TOLERANCE TEST 3 HOUR: 105 MG/DL (ref 65–139)

## 2024-03-01 PROCEDURE — 82951 GLUCOSE TOLERANCE TEST (GTT): CPT

## 2024-03-01 PROCEDURE — 82947 ASSAY GLUCOSE BLOOD QUANT: CPT

## 2024-03-01 PROCEDURE — 82952 GTT-ADDED SAMPLES: CPT

## 2024-03-01 PROCEDURE — 36415 COLL VENOUS BLD VENIPUNCTURE: CPT

## 2024-03-26 ENCOUNTER — ROUTINE PRENATAL (OUTPATIENT)
Dept: OBGYN | Age: 24
End: 2024-03-26
Payer: COMMERCIAL

## 2024-03-26 VITALS
DIASTOLIC BLOOD PRESSURE: 80 MMHG | SYSTOLIC BLOOD PRESSURE: 122 MMHG | HEART RATE: 88 BPM | WEIGHT: 167.2 LBS | BODY MASS INDEX: 32.65 KG/M2

## 2024-03-26 DIAGNOSIS — Z3A.30 30 WEEKS GESTATION OF PREGNANCY: Primary | ICD-10-CM

## 2024-03-26 DIAGNOSIS — Z23 NEED FOR TDAP VACCINATION: ICD-10-CM

## 2024-03-26 DIAGNOSIS — Z36.89 ENCOUNTER FOR ULTRASOUND TO ASSESS FETAL GROWTH: ICD-10-CM

## 2024-03-26 PROCEDURE — 0502F SUBSEQUENT PRENATAL CARE: CPT | Performed by: ADVANCED PRACTICE MIDWIFE

## 2024-03-26 PROCEDURE — 90471 IMMUNIZATION ADMIN: CPT | Performed by: ADVANCED PRACTICE MIDWIFE

## 2024-03-26 PROCEDURE — 90715 TDAP VACCINE 7 YRS/> IM: CPT | Performed by: ADVANCED PRACTICE MIDWIFE

## 2024-03-26 NOTE — PATIENT INSTRUCTIONS
Patient Education        Tdap (Tetanus, Diphtheria, Pertussis) Vaccine: What You Need to Know  Why get vaccinated?  Tdap vaccine can prevent tetanus, diphtheria, and pertussis.  Diphtheria and pertussis spread from person to person. Tetanus enters the body through cuts or wounds.  TETANUS (T) causes painful stiffening of the muscles. Tetanus can lead to serious health problems, including being unable to open the mouth, having trouble swallowing and breathing, or death.  DIPHTHERIA (D) can lead to difficulty breathing, heart failure, paralysis, or death.  PERTUSSIS (aP), also known as \"whooping cough,\" can cause uncontrollable, violent coughing that makes it hard to breathe, eat, or drink. Pertussis can be extremely serious especially in babies and young children, causing pneumonia, convulsions, brain damage, or death. In teens and adults, it can cause weight loss, loss of bladder control, passing out, and rib fractures from severe coughing.  Tdap vaccine  Tdap is only for children 7 years and older, adolescents, and adults.  Adolescents should receive a single dose of Tdap, preferably at age 11 or 12 years.  Pregnant people should get a dose of Tdap during every pregnancy, preferably during the early part of the third trimester, to help protect the  from pertussis. Infants are most at risk for severe, life-threatening complications from pertussis.  Adults who have never received Tdap should get a dose of Tdap.  Also, adults should receive a booster dose of either Tdap or Td (a different vaccine that protects against tetanus and diphtheria but not pertussis) every 10 years, or after 5 years in the case of a severe or dirty wound or burn.  Tdap may be given at the same time as other vaccines.  Talk with your health care provider  Tell your vaccination provider if the person getting the vaccine:  Has had an allergic reaction after a previous dose of any vaccine that protects against tetanus, diphtheria, or

## 2024-03-26 NOTE — PROGRESS NOTES
Kris Vásquez is here at 30w0d for:    Chief Complaint   Patient presents with    Routine Prenatal Visit     F/U in house 30 week u/s. Pt states no issues or concerns. Pt would like the tdap vaccine        Estimated Due Date: Estimated Date of Delivery: 24    OB History    Para Term  AB Living   2 1 1     1   SAB IAB Ectopic Molar Multiple Live Births           0 1      # Outcome Date GA Lbr Augustus/2nd Weight Sex Delivery Anes PTL Lv   2 Current            1 Term 22 40w1d 04:14  00:37 3.852 kg (8 lb 7.9 oz) F Vag-Spont EPI N SYMONE      Complications: Meconium at birth, Gestational diabetes        Past Medical History:   Diagnosis Date    Gestational diabetes mellitus     Gestational diabetes mellitus (GDM) affecting pregnancy 2022       History reviewed. No pertinent surgical history.    Social History     Tobacco Use   Smoking Status Never   Smokeless Tobacco Never        Social History     Substance and Sexual Activity   Alcohol Use Not Currently               HPI: Patient here today for routine prenatal visit with review of growth ultrasound today. Patients denies issues or concerns.    Yes PT denies fever, chills, nausea and vomiting       Vitals:  Estimated body mass index is 32.65 kg/m² as calculated from the following:    Height as of 23: 1.524 m (5').    Weight as of this encounter: 75.8 kg (167 lb 3.2 oz).  BP: 122/80  Weight - Scale: 75.8 kg (167 lb 3.2 oz)  Pulse: 88  Patient Position: Sitting  Albumin: Negative  Glucose: Negative  Fetal HR: 124 US  Movement: Present           Abdomen: soft    Results reviewed today:    3/26/2024  1:25 PM EDT      31.5 WK IUP  EFW:80.1% (3lb 13oz)  CL:4.1 cm  SHARON:17.5 cm  HR:124 bpm  Posterior placenta, cephalic presentation  Active fetal movements.           ASSESSMENT & Plan    Diagnosis Orders   1. 30 weeks gestation of pregnancy  Tdap, BOOSTRIX, (age 10 yrs+), IM      2. Encounter for ultrasound to assess fetal growth        3. Need

## 2024-04-09 ENCOUNTER — ROUTINE PRENATAL (OUTPATIENT)
Dept: OBGYN | Age: 24
End: 2024-04-09

## 2024-04-09 VITALS
HEART RATE: 107 BPM | WEIGHT: 168 LBS | SYSTOLIC BLOOD PRESSURE: 124 MMHG | BODY MASS INDEX: 32.81 KG/M2 | DIASTOLIC BLOOD PRESSURE: 80 MMHG

## 2024-04-09 DIAGNOSIS — Z3A.32 32 WEEKS GESTATION OF PREGNANCY: Primary | ICD-10-CM

## 2024-04-09 DIAGNOSIS — Z34.83 ENCOUNTER FOR SUPERVISION OF OTHER NORMAL PREGNANCY, THIRD TRIMESTER: ICD-10-CM

## 2024-04-09 PROCEDURE — 0502F SUBSEQUENT PRENATAL CARE: CPT | Performed by: ADVANCED PRACTICE MIDWIFE

## 2024-04-09 SDOH — ECONOMIC STABILITY: FOOD INSECURITY: WITHIN THE PAST 12 MONTHS, THE FOOD YOU BOUGHT JUST DIDN'T LAST AND YOU DIDN'T HAVE MONEY TO GET MORE.: NEVER TRUE

## 2024-04-09 SDOH — ECONOMIC STABILITY: INCOME INSECURITY: HOW HARD IS IT FOR YOU TO PAY FOR THE VERY BASICS LIKE FOOD, HOUSING, MEDICAL CARE, AND HEATING?: NOT HARD AT ALL

## 2024-04-09 SDOH — ECONOMIC STABILITY: FOOD INSECURITY: WITHIN THE PAST 12 MONTHS, YOU WORRIED THAT YOUR FOOD WOULD RUN OUT BEFORE YOU GOT MONEY TO BUY MORE.: NEVER TRUE

## 2024-04-09 NOTE — PROGRESS NOTES
Kris Vásquez is here at 32w0d for:    Chief Complaint   Patient presents with    Routine Prenatal Visit     Pt states no issues or concerns        Estimated Due Date: Estimated Date of Delivery: 24    OB History    Para Term  AB Living   2 1 1     1   SAB IAB Ectopic Molar Multiple Live Births           0 1      # Outcome Date GA Lbr Augustus/2nd Weight Sex Delivery Anes PTL Lv   2 Current            1 Term 22 40w1d 04:14 / 00:37 3.852 kg (8 lb 7.9 oz) F Vag-Spont EPI N SYMONE      Complications: Meconium at birth, Gestational diabetes        Past Medical History:   Diagnosis Date    Gestational diabetes mellitus     Gestational diabetes mellitus (GDM) affecting pregnancy 2022       History reviewed. No pertinent surgical history.    Social History     Tobacco Use   Smoking Status Never   Smokeless Tobacco Never        Social History     Substance and Sexual Activity   Alcohol Use Not Currently               HPI: Patient here today for routine prenatal visit. She denies concerns at this time.     Yes PT denies fever, chills, nausea and vomiting       Vitals:  Estimated body mass index is 32.81 kg/m² as calculated from the following:    Height as of 23: 1.524 m (5').    Weight as of this encounter: 76.2 kg (168 lb).  BP: 124/80  Weight - Scale: 76.2 kg (168 lb)  Pulse: (!) 107  Patient Position: Sitting  Albumin: Trace  Glucose: Negative  Fundal Height (cm): 33 cm  Fetal HR: 145  Movement: Present           Abdomen: soft    Results reviewed today:    No results found for this visit on 24.        ASSESSMENT & Plan    Diagnosis Orders   1. 32 weeks gestation of pregnancy        2. Encounter for supervision of other normal pregnancy, third trimester                  I am having Kris Vásquez maintain her Prenatal MV-Min-Fe Fum-FA-DHA (PRENATAL 1 PO).    Return for Keep next appt.    There are no Patient Instructions on file for this visit.      Clinical References           Weeks 32

## 2024-04-19 LAB
CHOLEST SERPL-MCNC: 253 MG/DL (ref 0–199)
CHOLESTEROL/HDL RATIO: 4
GLUCOSE SERPL-MCNC: 83 MG/DL (ref 70–99)
HDLC SERPL-MCNC: 58 MG/DL
LDLC SERPL CALC-MCNC: 161 MG/DL (ref 0–100)
PATIENT FASTING?: YES
TRIGL SERPL-MCNC: 172 MG/DL
VLDLC SERPL CALC-MCNC: 34 MG/DL

## 2024-04-25 ENCOUNTER — ROUTINE PRENATAL (OUTPATIENT)
Dept: OBGYN | Age: 24
End: 2024-04-25

## 2024-04-25 VITALS
DIASTOLIC BLOOD PRESSURE: 86 MMHG | BODY MASS INDEX: 33.2 KG/M2 | HEART RATE: 97 BPM | SYSTOLIC BLOOD PRESSURE: 134 MMHG | WEIGHT: 170 LBS

## 2024-04-25 DIAGNOSIS — Z34.83 ENCOUNTER FOR SUPERVISION OF OTHER NORMAL PREGNANCY, THIRD TRIMESTER: ICD-10-CM

## 2024-04-25 DIAGNOSIS — Z3A.34 34 WEEKS GESTATION OF PREGNANCY: Primary | ICD-10-CM

## 2024-04-25 PROCEDURE — 0502F SUBSEQUENT PRENATAL CARE: CPT | Performed by: ADVANCED PRACTICE MIDWIFE

## 2024-04-25 NOTE — PROGRESS NOTES
Kris Vásquez is here at 34w2d for:    Chief Complaint   Patient presents with    Routine Prenatal Visit     Pt states no issues or concerns        Estimated Due Date: Estimated Date of Delivery: 24    OB History    Para Term  AB Living   2 1 1     1   SAB IAB Ectopic Molar Multiple Live Births           0 1      # Outcome Date GA Lbr Augustus/2nd Weight Sex Delivery Anes PTL Lv   2 Current            1 Term 22 40w1d 04:14 / 00:37 3.852 kg (8 lb 7.9 oz) F Vag-Spont EPI N SYMONE      Complications: Meconium at birth, Gestational diabetes        Past Medical History:   Diagnosis Date    Gestational diabetes mellitus     Gestational diabetes mellitus (GDM) affecting pregnancy 2022       History reviewed. No pertinent surgical history.    Social History     Tobacco Use   Smoking Status Never   Smokeless Tobacco Never        Social History     Substance and Sexual Activity   Alcohol Use Not Currently               HPI: PT states she is doing well and denies needs at this time.     Yes PT denies fever, chills, nausea and vomiting       Vitals:  Estimated body mass index is 33.2 kg/m² as calculated from the following:    Height as of 23: 1.524 m (5').    Weight as of this encounter: 77.1 kg (170 lb).  BP: 134/86  Weight - Scale: 77.1 kg (170 lb)  Pulse: 97  Patient Position: Sitting  Albumin: Negative  Glucose: Negative  Fundal Height (cm): 35 cm  Fetal HR: 148  Movement: Present           Abdomen: soft    Results reviewed today:    No results found for this visit on 24.        ASSESSMENT & Plan    Diagnosis Orders   1. 34 weeks gestation of pregnancy        2. Encounter for supervision of other normal pregnancy, third trimester                  I am having Kris Vásquez maintain her Prenatal MV-Min-Fe Fum-FA-DHA (PRENATAL 1 PO).    Return for Keep next appt.    There are no Patient Instructions on file for this visit.      Clinical References           Weeks 34 to 36 of Your Pregnancy:

## 2024-05-09 ENCOUNTER — ROUTINE PRENATAL (OUTPATIENT)
Dept: OBGYN | Age: 24
End: 2024-05-09

## 2024-05-09 ENCOUNTER — HOSPITAL ENCOUNTER (OUTPATIENT)
Age: 24
Setting detail: SPECIMEN
Discharge: HOME | End: 2024-05-09
Payer: COMMERCIAL

## 2024-05-09 ENCOUNTER — ANCILLARY PROCEDURE (OUTPATIENT)
Dept: OBGYN | Age: 24
End: 2024-05-09
Payer: COMMERCIAL

## 2024-05-09 VITALS
SYSTOLIC BLOOD PRESSURE: 124 MMHG | DIASTOLIC BLOOD PRESSURE: 84 MMHG | WEIGHT: 169.2 LBS | HEART RATE: 114 BPM | BODY MASS INDEX: 33.04 KG/M2

## 2024-05-09 DIAGNOSIS — Z36.89 ENCOUNTER FOR ULTRASOUND TO ASSESS FETAL GROWTH: ICD-10-CM

## 2024-05-09 DIAGNOSIS — Z3A.36 36 WEEKS GESTATION OF PREGNANCY: ICD-10-CM

## 2024-05-09 DIAGNOSIS — Z3A.36 36 WEEKS GESTATION OF PREGNANCY: Primary | ICD-10-CM

## 2024-05-09 DIAGNOSIS — Z34.83 ENCOUNTER FOR SUPERVISION OF OTHER NORMAL PREGNANCY, THIRD TRIMESTER: ICD-10-CM

## 2024-05-09 PROCEDURE — 87081 CULTURE SCREEN ONLY: CPT

## 2024-05-09 PROCEDURE — 0502F SUBSEQUENT PRENATAL CARE: CPT | Performed by: ADVANCED PRACTICE MIDWIFE

## 2024-05-09 NOTE — PROGRESS NOTES
Kris Vásquez is here at 36w2d for:    Chief Complaint   Patient presents with    Routine Prenatal Visit     F/U in house u/s, GBS-SVE. Pt states no issues or concerns        Estimated Due Date: Estimated Date of Delivery: 24    OB History    Para Term  AB Living   2 1 1     1   SAB IAB Ectopic Molar Multiple Live Births           0 1      # Outcome Date GA Lbr Augustus/2nd Weight Sex Delivery Anes PTL Lv   2 Current            1 Term 22 40w1d 04:14  00:37 3.852 kg (8 lb 7.9 oz) F Vag-Spont EPI N SYMONE      Complications: Meconium at birth, Gestational diabetes        Past Medical History:   Diagnosis Date    Gestational diabetes mellitus     Gestational diabetes mellitus (GDM) affecting pregnancy 2022       History reviewed. No pertinent surgical history.    Social History     Tobacco Use   Smoking Status Never   Smokeless Tobacco Never        Social History     Substance and Sexual Activity   Alcohol Use Not Currently               HPI: Patient here today for OB visit.  Patient denies needs or concerns at this time patient is questioning very late I did calculate out notably about the same as her last baby    Yes PT denies fever, chills, nausea and vomiting       Vitals:  Estimated body mass index is 33.04 kg/m² as calculated from the following:    Height as of 23: 1.524 m (5').    Weight as of this encounter: 76.7 kg (169 lb 3.2 oz).  BP: 124/84  Weight - Scale: 76.7 kg (169 lb 3.2 oz)  Pulse: (!) 114  Patient Position: Sitting  Albumin: Negative  Glucose: Negative  Fundal Height (cm): 37 cm  Movement: Present  Dilation (cm): 3  Effacement: 60  Station: -2  Comments: vertex           Abdomen: soft    Results reviewed today:     2024 11:15 AM EDT      37.5 WK IUP  EFW:69% (6lb 12oz)  CL:unable to measure, due to fetal head position   SHARON:17cm  HR:160bpm  posterior placenta, cephalic presentation  Active fetal movements            ASSESSMENT & Plan    Diagnosis Orders   1. 36 weeks

## 2024-05-12 LAB
MICROORGANISM SPEC CULT: NORMAL
SPECIMEN DESCRIPTION: NORMAL

## 2024-05-16 ENCOUNTER — ROUTINE PRENATAL (OUTPATIENT)
Dept: OBGYN | Age: 24
End: 2024-05-16

## 2024-05-16 VITALS
BODY MASS INDEX: 33.59 KG/M2 | SYSTOLIC BLOOD PRESSURE: 124 MMHG | DIASTOLIC BLOOD PRESSURE: 80 MMHG | WEIGHT: 172 LBS | HEART RATE: 103 BPM

## 2024-05-16 DIAGNOSIS — Z3A.37 37 WEEKS GESTATION OF PREGNANCY: Primary | ICD-10-CM

## 2024-05-16 DIAGNOSIS — Z34.83 ENCOUNTER FOR SUPERVISION OF OTHER NORMAL PREGNANCY, THIRD TRIMESTER: ICD-10-CM

## 2024-05-16 PROCEDURE — 0502F SUBSEQUENT PRENATAL CARE: CPT | Performed by: ADVANCED PRACTICE MIDWIFE

## 2024-05-16 NOTE — PROGRESS NOTES
Kris Vásquez is here at 37w2d for:    Chief Complaint   Patient presents with    Routine Prenatal Visit     SVE, pt states c/o swelling in both her ankles        Estimated Due Date: Estimated Date of Delivery: 24    OB History    Para Term  AB Living   2 1 1     1   SAB IAB Ectopic Molar Multiple Live Births           0 1      # Outcome Date GA Lbr Augustus/2nd Weight Sex Delivery Anes PTL Lv   2 Current            1 Term 22 40w1d 04:14 / 00:37 3.852 kg (8 lb 7.9 oz) F Vag-Spont EPI N SYMONE      Complications: Meconium at birth, Gestational diabetes        Past Medical History:   Diagnosis Date    Gestational diabetes mellitus     Gestational diabetes mellitus (GDM) affecting pregnancy 2022       History reviewed. No pertinent surgical history.    Social History     Tobacco Use   Smoking Status Never   Smokeless Tobacco Never        Social History     Substance and Sexual Activity   Alcohol Use Not Currently               HPI: Patient here today for OB visit.  Patient states that she is having swelling in both legs    Yes PT denies fever, chills, nausea and vomiting       Vitals:  Estimated body mass index is 33.59 kg/m² as calculated from the following:    Height as of 23: 1.524 m (5').    Weight as of this encounter: 78 kg (172 lb).  BP: 124/80  Weight - Scale: 78 kg (172 lb)  Pulse: (!) 103  Patient Position: Sitting  Albumin: Negative  Glucose: Negative  Movement: Present           Abdomen: soft    Results reviewed today:    No results found for this visit on 24.        ASSESSMENT & Plan    Diagnosis Orders   1. 37 weeks gestation of pregnancy        2. Encounter for supervision of other normal pregnancy, third trimester                  I am having Kris Vásquez maintain her Prenatal MV-Min-Fe Fum-FA-DHA (PRENATAL 1 PO).    No follow-ups on file.    There are no Patient Instructions on file for this visit.             GORDO Laureano CNM,2024 3:25 PM

## 2024-05-21 ENCOUNTER — HOSPITAL ENCOUNTER (OUTPATIENT)
Age: 24
Discharge: HOME | End: 2024-05-21
Attending: ADVANCED PRACTICE MIDWIFE | Admitting: ADVANCED PRACTICE MIDWIFE
Payer: COMMERCIAL

## 2024-05-21 ENCOUNTER — TELEPHONE (OUTPATIENT)
Dept: OBGYN | Age: 24
End: 2024-05-21

## 2024-05-21 VITALS
RESPIRATION RATE: 16 BRPM | HEART RATE: 81 BPM | TEMPERATURE: 97.9 F | SYSTOLIC BLOOD PRESSURE: 120 MMHG | OXYGEN SATURATION: 95 % | DIASTOLIC BLOOD PRESSURE: 70 MMHG

## 2024-05-21 PROBLEM — Z3A.38 38 WEEKS GESTATION OF PREGNANCY: Status: ACTIVE | Noted: 2024-05-21

## 2024-05-21 LAB
BACTERIA URNS QL MICRO: ABNORMAL
BILIRUB UR QL STRIP: NEGATIVE
CLARITY UR: CLEAR
COLOR UR: YELLOW
EPI CELLS #/AREA URNS HPF: ABNORMAL /HPF (ref 0–25)
GLUCOSE UR STRIP-MCNC: NEGATIVE MG/DL
HGB UR QL STRIP.AUTO: NEGATIVE
KETONES UR STRIP-MCNC: ABNORMAL MG/DL
LEUKOCYTE ESTERASE UR QL STRIP: NEGATIVE
NITRITE UR QL STRIP: NEGATIVE
PH UR STRIP: 7 [PH] (ref 5–9)
PROT UR STRIP-MCNC: NEGATIVE MG/DL
RBC #/AREA URNS HPF: ABNORMAL /HPF (ref 0–2)
SP GR UR STRIP: 1.01 (ref 1.01–1.02)
UROBILINOGEN UR STRIP-ACNC: NORMAL EU/DL (ref 0–1)
WBC #/AREA URNS HPF: ABNORMAL /HPF (ref 0–5)

## 2024-05-21 PROCEDURE — 81001 URINALYSIS AUTO W/SCOPE: CPT

## 2024-05-21 PROCEDURE — 59025 FETAL NON-STRESS TEST: CPT | Performed by: ADVANCED PRACTICE MIDWIFE

## 2024-05-21 PROCEDURE — 59025 FETAL NON-STRESS TEST: CPT

## 2024-05-21 PROCEDURE — 99213 OFFICE O/P EST LOW 20 MIN: CPT

## 2024-05-21 PROCEDURE — 87086 URINE CULTURE/COLONY COUNT: CPT

## 2024-05-21 NOTE — PROGRESS NOTES
Department of Obstetrics and Gynecology  Labor and Delivery  Triage Note      SUBJECTIVE:  pt states she is feeling large fetal movements. Pt is feeling contractions but they are in her abdomen now and was in her back before.     OBJECTIVE    HISTORY OF PRESENT ILLNESS:      The patient is a 24 y.o. female at 38w0d.  OB History          2    Para   1    Term   1            AB        Living   1         SAB        IAB        Ectopic        Molar        Multiple   0    Live Births   1                Estimated Due Date: Estimated Date of Delivery: 24    Past Medical History:   Diagnosis Date    Gestational diabetes mellitus     Gestational diabetes mellitus (GDM) affecting pregnancy 2022       Vitals:  /70   Pulse 81   Temp 97.9 °F (36.6 °C) (Oral)   Resp 16   LMP 2023   SpO2 95%     CONSTITUTIONAL:  awake, alert, cooperative, no apparent distress, and appears stated age  ABDOMEN:  No scars, normal bowel sounds, soft, non-distended, non-tender, no masses palpated, no hepatosplenomegally    Cervix:    On admission RN state 3 70 -1           Current:  Dilation:  4 cm         Effacement:  80         Station:  -1          Consistency:  soft         Position:  mid posterior    Fetal Position:  Cephalic    Membranes:  Intact    Fetal heart rate:    Baseline: 124 bpm,          accelerations:  present    variability:  moderate   decelerations:  variable    NST:  reactive    Contraction frequency: 2-6 minutes    DATA:  Results for orders placed or performed during the hospital encounter of 24   Urinalysis   Result Value Ref Range    Color, UA Yellow Yellow    Turbidity UA Clear Clear    Glucose, Ur NEGATIVE NEGATIVE mg/dL    Bilirubin, Urine NEGATIVE NEGATIVE    Ketones, Urine 4+ (A) NEGATIVE mg/dL    Specific Gravity, UA 1.010 1.010 - 1.020    Urine Hgb NEGATIVE NEGATIVE    pH, Urine 7.0 5.0 - 9.0    Protein, UA NEGATIVE NEGATIVE mg/dL    Urobilinogen, Urine Normal 0.0 - 1.0 EU/dL

## 2024-05-21 NOTE — TELEPHONE ENCOUNTER
Pt called stating she is having consistant contractions every 5 minutes and also having back pain. Pt denies any bleeding or other issues at this time. Writer informed pt to go to L&D asap. Pt is currently 38 weeks pregnant. Manuel PULIDO informed.

## 2024-05-22 LAB
MICROORGANISM SPEC CULT: NORMAL
SPECIMEN DESCRIPTION: NORMAL

## 2024-05-22 NOTE — DISCHARGE INSTRUCTIONS
OUTPATIENT DISCHARGE  Dr. Ramiro Griffin [facility]  Dr. Dylon Christianson [facility]  [address]  [address] [phone]   or [place] [phone]     ACTIVITY LIMITATIONS:  ( X )Up and about as desired and tolerated  (  )Up to bathroom only  (  )Lay on either side  (  )Avoid heavy lifting or exercise  (  )No sex  (  )No nipple stimulation  (  )Complet bedrest  (  )Avoid using stairs  ( X )Increase fluids  **DRINK AT LEAST eight-8oz. Glasses of water daily.**    Call your Doctor if:  ( X )Contractions are every 5 minutes apart (from start of one to the start of the next contraction) lasting 60 seconds for at least 1 hour, strong enough you can not walk or talk through the contraction and regular.  ( X )Bag of water breaks  ( X )Vaginal bleeding  ( X )Unusual pain occurs  ( X )Decreased fetal movement  (  ) labor:  If you have 4 contractions in an hour    Keep your scheduled follow up appointment.    IN CASE OF EMERGENCY CONTACT LABOR AND DELIVERY [phone].

## 2024-05-22 NOTE — FLOWSHEET NOTE
CIARA Griffin CNM performed SVE. States patient is unchanged from previous check. Patient can be discharged.  
How Severe Are Your Spot(S)?: mild
Patient arrives to room 210.  Here with complaints of  contractions all day irregularly, then at 3:45 contractions started to come every 5-6 minutes with a pain level of 3. States she usually drinks around 120 ounces of water per day and only drank around 50 ounces today.     Denies ROM or vaginal bleeding.  Denies N/V/D.  Denies fever/chills.      Assisted into bed, call light within reach.    Relates of positive fetal movement.  EFM applied and monitor test completed/passed.    Request for urine sample made and instructed on clean catch urine.     Consent obtained.          
RN discussed discharge paperwork with patient. Patient has no questions at this time. Patient ambulated off unit in stable condition.  
What Type Of Note Output Would You Prefer (Optional)?: Bullet Format
What Is The Reason For Today's Visit?: Full Body Skin Examination
What Is The Reason For Today's Visit? (Being Monitored For X): concerning skin lesions on a periodic basis

## 2024-05-22 NOTE — PROGRESS NOTES
Kriswilliam Vásquez is here in L&D at 38w0d for:rule out uterine contractions      Maternal Vitals  Temp: 97.9 °F (36.6 °C)  Temp Source: Oral  BP: 120/70  Pulse: 81  Respirations: 16         Findings  Baseline: 130 BPM  Baseline Classification: Normal  Variability: Moderate  Decelerations: Variable  Accelerations: Yes  Acoustic Stimulator: No  Fetal Movement: Yes  Multiple Gestation?: No  Uterine contractions/10 min.: 1  Interpretation: Reactive  Interpreted by: MANAS Louise RN, BOB Brown RN             NST Time start:  1820  NST Time stop:   1840      NST is reactive. Quality of tracing is satisfactory.    DX uterine contractions third trimester  NST reactive

## 2024-05-23 ENCOUNTER — ROUTINE PRENATAL (OUTPATIENT)
Dept: OBGYN | Age: 24
End: 2024-05-23

## 2024-05-23 VITALS
DIASTOLIC BLOOD PRESSURE: 64 MMHG | WEIGHT: 173 LBS | HEART RATE: 72 BPM | BODY MASS INDEX: 33.79 KG/M2 | SYSTOLIC BLOOD PRESSURE: 118 MMHG

## 2024-05-23 DIAGNOSIS — Z3A.38 38 WEEKS GESTATION OF PREGNANCY: Primary | ICD-10-CM

## 2024-05-23 DIAGNOSIS — Z34.83 ENCOUNTER FOR SUPERVISION OF OTHER NORMAL PREGNANCY, THIRD TRIMESTER: ICD-10-CM

## 2024-05-23 PROCEDURE — 0502F SUBSEQUENT PRENATAL CARE: CPT | Performed by: ADVANCED PRACTICE MIDWIFE

## 2024-05-23 NOTE — PROGRESS NOTES
Kris Vásquez is here at 38w2d for:    Chief Complaint   Patient presents with    Routine Prenatal Visit     Pt is here today for routine ob visit.       Estimated Due Date: Estimated Date of Delivery: 24    OB History    Para Term  AB Living   2 1 1     1   SAB IAB Ectopic Molar Multiple Live Births           0 1      # Outcome Date GA Lbr Augustus/2nd Weight Sex Delivery Anes PTL Lv   2 Current            1 Term 22 40w1d 04:14  00:37 3.852 kg (8 lb 7.9 oz) F Vag-Spont EPI N SYMONE      Complications: Meconium at birth, Gestational diabetes        Past Medical History:   Diagnosis Date    Gestational diabetes mellitus     Gestational diabetes mellitus (GDM) affecting pregnancy 2022       No past surgical history on file.    Social History     Tobacco Use   Smoking Status Never   Smokeless Tobacco Never        Social History     Substance and Sexual Activity   Alcohol Use Not Currently               HPI: Patient here today for routine prenatal visit. She denies issues or concerns. She states she is still having mild intermittent contractions.    Yes PT denies fever, chills, nausea and vomiting       Vitals:  Estimated body mass index is 33.79 kg/m² as calculated from the following:    Height as of 23: 1.524 m (5').    Weight as of this encounter: 78.5 kg (173 lb).  BP: 118/64  Weight - Scale: 78.5 kg (173 lb)  Pulse: 72  Patient Position: Sitting  Albumin: Negative  Glucose: Negative  Fundal Height (cm): 38 cm  Fetal HR: 145  Movement: Present  Dilation (cm): 4  Effacement: 80  Station: -2  Comments: Posterior           Abdomen: soft    Results reviewed today:    No results found for this visit on 24.        ASSESSMENT & Plan    Diagnosis Orders   1. 38 weeks gestation of pregnancy        2. Encounter for supervision of other normal pregnancy, third trimester                  I am having Kris Vásquez maintain her Prenatal MV-Min-Fe Fum-FA-DHA (PRENATAL 1 PO).    Return for Keep

## 2024-05-30 ENCOUNTER — ROUTINE PRENATAL (OUTPATIENT)
Dept: OBGYN | Age: 24
End: 2024-05-30

## 2024-05-30 VITALS
WEIGHT: 175 LBS | HEART RATE: 97 BPM | BODY MASS INDEX: 34.18 KG/M2 | SYSTOLIC BLOOD PRESSURE: 122 MMHG | DIASTOLIC BLOOD PRESSURE: 80 MMHG

## 2024-05-30 DIAGNOSIS — Z34.83 ENCOUNTER FOR SUPERVISION OF OTHER NORMAL PREGNANCY, THIRD TRIMESTER: ICD-10-CM

## 2024-05-30 DIAGNOSIS — Z3A.39 39 WEEKS GESTATION OF PREGNANCY: Primary | ICD-10-CM

## 2024-05-30 PROCEDURE — 0502F SUBSEQUENT PRENATAL CARE: CPT | Performed by: ADVANCED PRACTICE MIDWIFE

## 2024-05-30 NOTE — PROGRESS NOTES
Kris Vásquez is here at 39w2d for:    Chief Complaint   Patient presents with    Routine Prenatal Visit     SVE, pt states she is uncomfortable        Estimated Due Date: Estimated Date of Delivery: 24    OB History    Para Term  AB Living   2 1 1     1   SAB IAB Ectopic Molar Multiple Live Births           0 1      # Outcome Date GA Lbr Augustus/2nd Weight Sex Delivery Anes PTL Lv   2 Current            1 Term 22 40w1d 04:14  00:37 3.852 kg (8 lb 7.9 oz) F Vag-Spont EPI N SYMONE      Complications: Meconium at birth, Gestational diabetes        Past Medical History:   Diagnosis Date    Gestational diabetes mellitus     Gestational diabetes mellitus (GDM) affecting pregnancy 2022       History reviewed. No pertinent surgical history.    Social History     Tobacco Use   Smoking Status Never   Smokeless Tobacco Never        Social History     Substance and Sexual Activity   Alcohol Use Not Currently               HPI: Patient here today for OB visit.  Patient states she has had on and off contractions but nothing significant at this point.  Patient denies needs or concerns and would like her membranes stripped today    Yes PT denies fever, chills, nausea and vomiting       Vitals:  Estimated body mass index is 34.18 kg/m² as calculated from the following:    Height as of 23: 1.524 m (5').    Weight as of this encounter: 79.4 kg (175 lb).  BP: 122/80  Weight - Scale: 79.4 kg (175 lb)  Pulse: 97  Patient Position: Sitting  Albumin: Negative  Glucose: Negative  Fetal HR: 138  Movement: Present  Presentation: Vertex  Dilation (cm): 4  Effacement: 80  Station: -2  Comments: to pt right           Abdomen: soft    Results reviewed today:    No results found for this visit on 24.        ASSESSMENT & Plan    Diagnosis Orders   1. 39 weeks gestation of pregnancy        2. Encounter for supervision of other normal pregnancy, third trimester                  I am having Kris Vásquez maintain

## 2024-06-04 ENCOUNTER — ANESTHESIA (OUTPATIENT)
Dept: LABOR AND DELIVERY | Age: 24
End: 2024-06-04
Payer: COMMERCIAL

## 2024-06-04 ENCOUNTER — HOSPITAL ENCOUNTER (INPATIENT)
Age: 24
LOS: 2 days | Discharge: HOME | End: 2024-06-06
Attending: ADVANCED PRACTICE MIDWIFE | Admitting: ADVANCED PRACTICE MIDWIFE
Payer: COMMERCIAL

## 2024-06-04 ENCOUNTER — ANESTHESIA EVENT (OUTPATIENT)
Dept: LABOR AND DELIVERY | Age: 24
End: 2024-06-04
Payer: COMMERCIAL

## 2024-06-04 PROBLEM — Z3A.40 40 WEEKS GESTATION OF PREGNANCY: Status: ACTIVE | Noted: 2024-06-04

## 2024-06-04 LAB
ABO + RH BLD: NORMAL
ARM BAND NUMBER: NORMAL
BASOPHILS # BLD: 0.06 K/UL (ref 0–0.2)
BASOPHILS NFR BLD: 1 % (ref 0–2)
BLOOD BANK SAMPLE EXPIRATION: NORMAL
BLOOD GROUP ANTIBODIES SERPL: NEGATIVE
EOSINOPHIL # BLD: 0.09 K/UL (ref 0–0.44)
EOSINOPHILS RELATIVE PERCENT: 1 % (ref 1–4)
ERYTHROCYTE [DISTWIDTH] IN BLOOD BY AUTOMATED COUNT: 15.1 % (ref 11.8–14.4)
HCT VFR BLD AUTO: 36.6 % (ref 36.3–47.1)
HGB BLD-MCNC: 12.3 G/DL (ref 11.9–15.1)
IMM GRANULOCYTES # BLD AUTO: 0.24 K/UL (ref 0–0.3)
IMM GRANULOCYTES NFR BLD: 2 %
LYMPHOCYTES NFR BLD: 2.08 K/UL (ref 1.1–3.7)
LYMPHOCYTES RELATIVE PERCENT: 17 % (ref 24–43)
MCH RBC QN AUTO: 31.4 PG (ref 25.2–33.5)
MCHC RBC AUTO-ENTMCNC: 33.6 G/DL (ref 28.4–34.8)
MCV RBC AUTO: 93.4 FL (ref 82.6–102.9)
MONOCYTES NFR BLD: 0.85 K/UL (ref 0.1–1.2)
MONOCYTES NFR BLD: 7 % (ref 3–12)
NEUTROPHILS NFR BLD: 72 % (ref 36–65)
NEUTS SEG NFR BLD: 8.69 K/UL (ref 1.5–8.1)
NRBC BLD-RTO: 0 PER 100 WBC
PLATELET # BLD AUTO: 223 K/UL (ref 138–453)
PMV BLD AUTO: 10.4 FL (ref 8.1–13.5)
RBC # BLD AUTO: 3.92 M/UL (ref 3.95–5.11)
WBC OTHER # BLD: 12 K/UL (ref 3.5–11.3)

## 2024-06-04 PROCEDURE — 85025 COMPLETE CBC W/AUTO DIFF WBC: CPT

## 2024-06-04 PROCEDURE — 2580000003 HC RX 258: Performed by: ADVANCED PRACTICE MIDWIFE

## 2024-06-04 PROCEDURE — 1220000000 HC SEMI PRIVATE OB R&B

## 2024-06-04 PROCEDURE — 86901 BLOOD TYPING SEROLOGIC RH(D): CPT

## 2024-06-04 PROCEDURE — 6360000002 HC RX W HCPCS: Performed by: NURSE ANESTHETIST, CERTIFIED REGISTERED

## 2024-06-04 PROCEDURE — 6360000002 HC RX W HCPCS: Performed by: ADVANCED PRACTICE MIDWIFE

## 2024-06-04 PROCEDURE — 36415 COLL VENOUS BLD VENIPUNCTURE: CPT

## 2024-06-04 PROCEDURE — 3700000025 EPIDURAL BLOCK: Performed by: NURSE ANESTHETIST, CERTIFIED REGISTERED

## 2024-06-04 PROCEDURE — 86850 RBC ANTIBODY SCREEN: CPT

## 2024-06-04 PROCEDURE — 86900 BLOOD TYPING SEROLOGIC ABO: CPT

## 2024-06-04 RX ORDER — SODIUM CHLORIDE 0.9 % (FLUSH) 0.9 %
5-40 SYRINGE (ML) INJECTION EVERY 12 HOURS SCHEDULED
Status: DISCONTINUED | OUTPATIENT
Start: 2024-06-04 | End: 2024-06-05

## 2024-06-04 RX ORDER — CARBOPROST TROMETHAMINE 250 UG/ML
250 INJECTION, SOLUTION INTRAMUSCULAR PRN
Status: DISCONTINUED | OUTPATIENT
Start: 2024-06-04 | End: 2024-06-05

## 2024-06-04 RX ORDER — SODIUM CHLORIDE, SODIUM LACTATE, POTASSIUM CHLORIDE, AND CALCIUM CHLORIDE .6; .31; .03; .02 G/100ML; G/100ML; G/100ML; G/100ML
500 INJECTION, SOLUTION INTRAVENOUS PRN
Status: DISCONTINUED | OUTPATIENT
Start: 2024-06-04 | End: 2024-06-05

## 2024-06-04 RX ORDER — ONDANSETRON 2 MG/ML
4 INJECTION INTRAMUSCULAR; INTRAVENOUS EVERY 6 HOURS PRN
Status: DISCONTINUED | OUTPATIENT
Start: 2024-06-04 | End: 2024-06-04 | Stop reason: SDUPTHER

## 2024-06-04 RX ORDER — NALOXONE HYDROCHLORIDE 0.4 MG/ML
INJECTION, SOLUTION INTRAMUSCULAR; INTRAVENOUS; SUBCUTANEOUS PRN
Status: DISCONTINUED | OUTPATIENT
Start: 2024-06-04 | End: 2024-06-05

## 2024-06-04 RX ORDER — ROPIVACAINE HYDROCHLORIDE 2 MG/ML
INJECTION, SOLUTION EPIDURAL; INFILTRATION; PERINEURAL PRN
Status: DISCONTINUED | OUTPATIENT
Start: 2024-06-04 | End: 2024-06-05 | Stop reason: SDUPTHER

## 2024-06-04 RX ORDER — LIDOCAINE HYDROCHLORIDE 10 MG/ML
30 INJECTION, SOLUTION EPIDURAL; INFILTRATION; INTRACAUDAL; PERINEURAL PRN
Status: DISCONTINUED | OUTPATIENT
Start: 2024-06-04 | End: 2024-06-05

## 2024-06-04 RX ORDER — NALBUPHINE HYDROCHLORIDE 10 MG/ML
10 INJECTION, SOLUTION INTRAMUSCULAR; INTRAVENOUS; SUBCUTANEOUS
Status: DISCONTINUED | OUTPATIENT
Start: 2024-06-04 | End: 2024-06-05

## 2024-06-04 RX ORDER — ROPIVACAINE HYDROCHLORIDE 2 MG/ML
10 INJECTION, SOLUTION EPIDURAL; INFILTRATION; PERINEURAL CONTINUOUS
Status: DISCONTINUED | OUTPATIENT
Start: 2024-06-04 | End: 2024-06-05

## 2024-06-04 RX ORDER — METHYLERGONOVINE MALEATE 0.2 MG/ML
200 INJECTION INTRAVENOUS PRN
Status: DISCONTINUED | OUTPATIENT
Start: 2024-06-04 | End: 2024-06-05

## 2024-06-04 RX ORDER — EPHEDRINE SULFATE/0.9% NACL/PF 25 MG/5 ML
5 SYRINGE (ML) INTRAVENOUS PRN
Status: DISCONTINUED | OUTPATIENT
Start: 2024-06-04 | End: 2024-06-05

## 2024-06-04 RX ORDER — ONDANSETRON 4 MG/1
4 TABLET, ORALLY DISINTEGRATING ORAL EVERY 6 HOURS PRN
Status: DISCONTINUED | OUTPATIENT
Start: 2024-06-04 | End: 2024-06-05

## 2024-06-04 RX ORDER — ONDANSETRON 2 MG/ML
4 INJECTION INTRAMUSCULAR; INTRAVENOUS EVERY 6 HOURS PRN
Status: DISCONTINUED | OUTPATIENT
Start: 2024-06-04 | End: 2024-06-05

## 2024-06-04 RX ORDER — SODIUM CHLORIDE, SODIUM LACTATE, POTASSIUM CHLORIDE, AND CALCIUM CHLORIDE .6; .31; .03; .02 G/100ML; G/100ML; G/100ML; G/100ML
1000 INJECTION, SOLUTION INTRAVENOUS PRN
Status: DISCONTINUED | OUTPATIENT
Start: 2024-06-04 | End: 2024-06-05

## 2024-06-04 RX ORDER — SEVOFLURANE 250 ML/250ML
1 LIQUID RESPIRATORY (INHALATION) CONTINUOUS PRN
Status: DISCONTINUED | OUTPATIENT
Start: 2024-06-04 | End: 2024-06-05

## 2024-06-04 RX ORDER — ACETAMINOPHEN 325 MG/1
650 TABLET ORAL EVERY 4 HOURS PRN
Status: DISCONTINUED | OUTPATIENT
Start: 2024-06-04 | End: 2024-06-05

## 2024-06-04 RX ORDER — SODIUM CHLORIDE, SODIUM LACTATE, POTASSIUM CHLORIDE, CALCIUM CHLORIDE 600; 310; 30; 20 MG/100ML; MG/100ML; MG/100ML; MG/100ML
INJECTION, SOLUTION INTRAVENOUS CONTINUOUS
Status: DISCONTINUED | OUTPATIENT
Start: 2024-06-04 | End: 2024-06-05

## 2024-06-04 RX ADMIN — ROPIVACAINE HYDROCHLORIDE 8 ML: 2 INJECTION, SOLUTION EPIDURAL; INFILTRATION at 18:38

## 2024-06-04 RX ADMIN — SODIUM CHLORIDE, POTASSIUM CHLORIDE, SODIUM LACTATE AND CALCIUM CHLORIDE 1000 ML: 600; 310; 30; 20 INJECTION, SOLUTION INTRAVENOUS at 18:00

## 2024-06-04 RX ADMIN — ROPIVACAINE HYDROCHLORIDE 10 ML/HR: 2 INJECTION, SOLUTION EPIDURAL; INFILTRATION at 18:39

## 2024-06-04 RX ADMIN — SODIUM CHLORIDE, POTASSIUM CHLORIDE, SODIUM LACTATE AND CALCIUM CHLORIDE: 600; 310; 30; 20 INJECTION, SOLUTION INTRAVENOUS at 14:38

## 2024-06-04 RX ADMIN — Medication 1 MILLI-UNITS/MIN: at 14:43

## 2024-06-04 NOTE — PLAN OF CARE
Problem: Pain  Goal: Verbalizes/displays adequate comfort level or baseline comfort level  Outcome: Progressing     Problem: Vaginal Birth or  Section  Goal: Fetal and maternal status remain reassuring during the birth process  Description:  Birth OB-Pregnancy care plan goal which identifies if the fetal and maternal status remain reassuring during the birth process  Outcome: Progressing

## 2024-06-04 NOTE — ANESTHESIA PROCEDURE NOTES
Epidural Block    Patient location during procedure: patient floor  Start time: 6/4/2024 6:33 PM  End time: 6/4/2024 6:43 PM  Reason for block: labor epidural  Staffing  Resident/CRNA: Nico Brumfield APRN - CRNA  Performed by: Nico Brumfield APRN - CRNA  Authorized by: Nico Brumfield APRN - CRNA    Epidural  Patient position: sitting  Prep: ChloraPrep  Patient monitoring: cardiac monitor, continuous pulse ox and frequent blood pressure checks  Approach: midline  Location: L2-3  Injection technique: EMMANUELLE air  Provider prep: mask and sterile gloves  Needle  Needle type: Tuohy   Needle gauge: 17 G  Needle length: 3.5 in  Needle insertion depth: 5 cm  Catheter type: side hole  Catheter size: 19 G  Catheter at skin depth: 12 cm  Test dose: negativeCatheter Secured: tegaderm and tape  Assessment  Sensory level: T10  Hemodynamics: stable  Attempts: 1  Outcomes: uncomplicated and patient tolerated procedure well  Preanesthetic Checklist  Completed: patient identified, IV checked, site marked, risks and benefits discussed, surgical/procedural consents, equipment checked, pre-op evaluation, timeout performed, anesthesia consent given, oxygen available and monitors applied/VS acknowledged

## 2024-06-04 NOTE — PROGRESS NOTES
Call made to TESS Jackson informed of latest SVE and that pt in comfortable with epidural. Also informed her that pitocin needed to be 1/2ed d/t frequency of ctx. Updated that night shift RN would be NATALY Crocker, RN - Provider asked to relay to night shift RN to continue increasing pitocin when able.

## 2024-06-04 NOTE — PROGRESS NOTES
1755: Pt called out to nurses station reporting she was ready for epidural. TESS Jackson in the department, strip reviewed. Reported pt may have epidural.   1757: Bolus started

## 2024-06-04 NOTE — H&P
Department of Obstetrics and Gynecology   Obstetrics History and Physical  H&P Admission Inpatient  Note        CHIEF COMPLAINT:    Chief Complaint   Patient presents with    Scheduled Induction       HISTORY OF PRESENT ILLNESS:      The patient is a 24 y.o. female at 40w0d.  OB History          2    Para   1    Term   1            AB        Living   1         SAB        IAB        Ectopic        Molar        Multiple   0    Live Births   1            Patient presents with a chief complaint as above and is being admitted for induction    Estimated Due Date: Estimated Date of Delivery: 24    PRENATAL CARE:    Complicated by: none    PAST OB HISTORY:  OB History          2    Para   1    Term   1            AB        Living   1         SAB        IAB        Ectopic        Molar        Multiple   0    Live Births   1                Past Medical History:        Diagnosis Date    Gestational diabetes mellitus     Gestational diabetes mellitus (GDM) affecting pregnancy 2022     Past Surgical History:    History reviewed. No pertinent surgical history.  Allergies:  Patient has no known allergies.    Social History:    Social History     Socioeconomic History    Marital status:      Spouse name: Not on file    Number of children: Not on file    Years of education: Not on file    Highest education level: Not on file   Occupational History    Not on file   Tobacco Use    Smoking status: Never    Smokeless tobacco: Never   Vaping Use    Vaping Use: Never used   Substance and Sexual Activity    Alcohol use: Not Currently    Drug use: Never    Sexual activity: Yes     Partners: Male   Other Topics Concern    Not on file   Social History Narrative    Not on file     Social Determinants of Health     Financial Resource Strain: Low Risk  (2024)    Overall Financial Resource Strain (CARDIA)     Difficulty of Paying Living Expenses: Not hard at all   Food Insecurity: No Food Insecurity  Monocytes % 7 3 - 12 %    Eosinophils % 1 1 - 4 %    Basophils % 1 0 - 2 %    Immature Granulocytes % 2 (H) 0 %    Neutrophils Absolute 8.69 (H) 1.50 - 8.10 k/uL    Lymphocytes Absolute 2.08 1.10 - 3.70 k/uL    Monocytes Absolute 0.85 0.10 - 1.20 k/uL    Eosinophils Absolute 0.09 0.00 - 0.44 k/uL    Basophils Absolute 0.06 0.00 - 0.20 k/uL    Immature Granulocytes Absolute 0.24 0.00 - 0.30 k/uL   TYPE AND SCREEN   Result Value Ref Range    Blood Bank Sample Expiration 06/07/2024,2359     Arm Band Number [id number]     ABO/Rh O POSITIVE     Antibody Screen NEGATIVE        ASSESSMENT AND PLAN:    Estimated length of stay: 2 midnights following delivery    Principal Problem:    40 weeks gestation of pregnancy  Induction of Labor      Labor: Admit, anticipate normal delivery, routine labor orders  Fetus: Reassuring, Cat 1  GBS: No  Other: pitocin    Reviewed plan with Dr. Sky Griffin, APRN - MELISSAM,CNM,6/4/2024 4:39 PM

## 2024-06-04 NOTE — PROGRESS NOTES
1825: CRNA at bedside to talk with patient about epidural. All concerns addressed, all questions answered. Consent completed, patient agreeable to procedure Patient bent over pillow/table to assist with positioning for procedure. RN remained at bedside during procedure, intermittent monitoring of FHR heard throughout the process. +FM noted per pt.     1833: Time out completed, patient verbalized procedure,  and name    1835: Start of procedure     1837: Test dose completed, patient tolerated procedure well.      1843: Pt laid back in bed, semi-bentley with right tilt. Pt reports mild relief with following ctx.

## 2024-06-04 NOTE — ANESTHESIA PRE PROCEDURE
Cardiovascular:Negative CV ROS  Exercise tolerance: good (>4 METS)           Beta Blocker:  Not on Beta Blocker         Neuro/Psych:   Negative Neuro/Psych ROS              GI/Hepatic/Renal:   (+) GERD: well controlled          Endo/Other:      Diabetes: gestational..               Abdominal:             Vascular: negative vascular ROS.         Other Findings:             Anesthesia Plan      epidural     ASA 2             Anesthetic plan and risks discussed with patient.      Plan discussed with CRNA.          Post-op pain plan if not by surgeon: continuous epidural            GORDO Chan - LIVIA   6/4/2024

## 2024-06-05 PROCEDURE — 3E033VJ INTRODUCTION OF OTHER HORMONE INTO PERIPHERAL VEIN, PERCUTANEOUS APPROACH: ICD-10-PCS | Performed by: ADVANCED PRACTICE MIDWIFE

## 2024-06-05 PROCEDURE — 6370000000 HC RX 637 (ALT 250 FOR IP): Performed by: ADVANCED PRACTICE MIDWIFE

## 2024-06-05 PROCEDURE — 0HQ9XZZ REPAIR PERINEUM SKIN, EXTERNAL APPROACH: ICD-10-PCS | Performed by: ADVANCED PRACTICE MIDWIFE

## 2024-06-05 PROCEDURE — 59400 OBSTETRICAL CARE: CPT | Performed by: ADVANCED PRACTICE MIDWIFE

## 2024-06-05 PROCEDURE — 1220000000 HC SEMI PRIVATE OB R&B

## 2024-06-05 PROCEDURE — 10907ZC DRAINAGE OF AMNIOTIC FLUID, THERAPEUTIC FROM PRODUCTS OF CONCEPTION, VIA NATURAL OR ARTIFICIAL OPENING: ICD-10-PCS | Performed by: ADVANCED PRACTICE MIDWIFE

## 2024-06-05 PROCEDURE — 51702 INSERT TEMP BLADDER CATH: CPT

## 2024-06-05 PROCEDURE — 7200000001 HC VAGINAL DELIVERY

## 2024-06-05 RX ORDER — MISOPROSTOL 100 UG/1
800 TABLET ORAL PRN
Status: DISCONTINUED | OUTPATIENT
Start: 2024-06-05 | End: 2024-06-06 | Stop reason: HOSPADM

## 2024-06-05 RX ORDER — ACETAMINOPHEN 500 MG
1000 TABLET ORAL EVERY 8 HOURS PRN
Status: DISCONTINUED | OUTPATIENT
Start: 2024-06-05 | End: 2024-06-06 | Stop reason: HOSPADM

## 2024-06-05 RX ORDER — SODIUM CHLORIDE 0.9 % (FLUSH) 0.9 %
5-40 SYRINGE (ML) INJECTION EVERY 12 HOURS SCHEDULED
Status: DISCONTINUED | OUTPATIENT
Start: 2024-06-05 | End: 2024-06-06 | Stop reason: HOSPADM

## 2024-06-05 RX ORDER — MODIFIED LANOLIN
OINTMENT (GRAM) TOPICAL PRN
Status: DISCONTINUED | OUTPATIENT
Start: 2024-06-05 | End: 2024-06-06 | Stop reason: HOSPADM

## 2024-06-05 RX ORDER — IBUPROFEN 800 MG/1
800 TABLET ORAL EVERY 8 HOURS SCHEDULED
Status: DISCONTINUED | OUTPATIENT
Start: 2024-06-05 | End: 2024-06-06 | Stop reason: HOSPADM

## 2024-06-05 RX ORDER — METHYLERGONOVINE MALEATE 0.2 MG/ML
200 INJECTION INTRAVENOUS PRN
Status: DISCONTINUED | OUTPATIENT
Start: 2024-06-05 | End: 2024-06-06 | Stop reason: HOSPADM

## 2024-06-05 RX ORDER — CARBOPROST TROMETHAMINE 250 UG/ML
250 INJECTION, SOLUTION INTRAMUSCULAR PRN
Status: DISCONTINUED | OUTPATIENT
Start: 2024-06-05 | End: 2024-06-06 | Stop reason: HOSPADM

## 2024-06-05 RX ORDER — DOCUSATE SODIUM 100 MG/1
100 CAPSULE, LIQUID FILLED ORAL 2 TIMES DAILY PRN
Status: DISCONTINUED | OUTPATIENT
Start: 2024-06-05 | End: 2024-06-06 | Stop reason: HOSPADM

## 2024-06-05 RX ADMIN — Medication: at 01:25

## 2024-06-05 RX ADMIN — IBUPROFEN 800 MG: 800 TABLET, FILM COATED ORAL at 06:03

## 2024-06-05 RX ADMIN — IBUPROFEN 800 MG: 800 TABLET, FILM COATED ORAL at 15:22

## 2024-06-05 RX ADMIN — ACETAMINOPHEN 1000 MG: 500 TABLET ORAL at 20:22

## 2024-06-05 RX ADMIN — WITCH HAZEL: 500 SOLUTION RECTAL; TOPICAL at 01:12

## 2024-06-05 ASSESSMENT — PAIN DESCRIPTION - DESCRIPTORS
DESCRIPTORS: CRAMPING
DESCRIPTORS: CRAMPING

## 2024-06-05 ASSESSMENT — PAIN DESCRIPTION - LOCATION
LOCATION: ABDOMEN
LOCATION: ABDOMEN

## 2024-06-05 ASSESSMENT — PAIN SCALES - GENERAL
PAINLEVEL_OUTOF10: 4
PAINLEVEL_OUTOF10: 4

## 2024-06-05 NOTE — PLAN OF CARE
Problem: Pain  Goal: Verbalizes/displays adequate comfort level or baseline comfort level  2024 by Toyin Crocker RN  Outcome: Progressing  2024 by Elena Friedman RN  Outcome: Progressing     Problem: Vaginal Birth or  Section  Goal: Fetal and maternal status remain reassuring during the birth process  Description:  Birth OB-Pregnancy care plan goal which identifies if the fetal and maternal status remain reassuring during the birth process  2024 by Toyin Crocker RN  Outcome: Completed  2024 by Elena Friedman RN  Outcome: Progressing     Problem: Postpartum  Goal: Experiences normal postpartum course  Description:  Postpartum OB-Pregnancy care plan goal which identifies if the mother is experiencing a normal postpartum course  Outcome: Progressing  Goal: Appropriate maternal -  bonding  Description:  Postpartum OB-Pregnancy care plan goal which identifies if the mother and  are bonding appropriately  Outcome: Progressing  Goal: Establishment of infant feeding pattern  Description:  Postpartum OB-Pregnancy care plan goal which identifies if the mother is establishing a feeding pattern with their   Outcome: Progressing  Goal: Incisions, wounds, or drain sites healing without S/S of infection  Outcome: Progressing     Problem: Safety - Adult  Goal: Free from fall injury  Outcome: Progressing     Problem: Discharge Planning  Goal: Discharge to home or other facility with appropriate resources  Outcome: Progressing

## 2024-06-05 NOTE — LACTATION NOTE
Lactation education:    [x] Latch/ good latch vs shallow latch/ steps to obtaining deep latch    [x] How to know if infant is eating enough/ feedings per 24 hours, wet/dirty diapers    [x] Feeding/satiety cues      Lactation education resources given:     [x]  How to Breastfeed your baby - [facility] publication      [x]  Follow up support information    [x]  Breast milk storage guidelines - [facility]    [x]  Breastpump cleaning guidelines - [facility]     [x]  Breastfeeding & Safe Sleep handout - [facility] publication    []  Calling All Dads! Handout - [facility] publication      []  Breast and Nipple Care - [facility]     []  Breastmilk Collection & Storage - [facility]    []  Breastpump Kit Care - [facility]    []  Going Back to Work - [facility]    []  Preventing Engorgement - [facility]    Supplies given:    []  Brush, soap and basin for breastpump cleaning    []  Insurance pump provided     []  [facility] pump set up for patient to use    Explained to patient, patient verbalizes understanding.        Signed:  Emily Barfield RN, BSN, IBCLC

## 2024-06-05 NOTE — PROGRESS NOTES
Writer updates provider on SVE,patient still has small lip of cervix left on her right side, provider would like RN to practice push with patient and assess for movement of the lip of cervix

## 2024-06-05 NOTE — PROGRESS NOTES
3547- writer calls Óscar to report movement of cervix and decent of fetal head during contractions and during practice pushes, provider wants patient in throne position and says she will be enroute

## 2024-06-05 NOTE — PROGRESS NOTES
25yo F arrives to [facility] [place] with SO for scheduled induction. Pt denies LOF or vaginal bleeding, reports mild inconsistent ctxs. Pt instructed to change into gown. EFM placed on pt, educated on induction process, consents previously obtained. SO remains at bedside, call light within reach.

## 2024-06-05 NOTE — PLAN OF CARE
Problem: Pain  Goal: Verbalizes/displays adequate comfort level or baseline comfort level  2024 by Toyin Crocker RN  Outcome: Progressing  2024 by Toyin Crocker RN  Outcome: Progressing  2024 by Elena Friedman RN  Outcome: Progressing     Problem: Vaginal Birth or  Section  Goal: Fetal and maternal status remain reassuring during the birth process  Description:  Birth OB-Pregnancy care plan goal which identifies if the fetal and maternal status remain reassuring during the birth process  2024 by Toyin Crocker RN  Outcome: Completed  2024 by Elena Friedman RN  Outcome: Progressing     Problem: Postpartum  Goal: Experiences normal postpartum course  Description:  Postpartum OB-Pregnancy care plan goal which identifies if the mother is experiencing a normal postpartum course  2024 by Toyin Crocker RN  Outcome: Progressing  2024 by Toyin Crocker RN  Outcome: Progressing  Goal: Appropriate maternal -  bonding  Description:  Postpartum OB-Pregnancy care plan goal which identifies if the mother and  are bonding appropriately  2024 by Toyin Crocker RN  Outcome: Progressing  2024 by Toyin Crocker RN  Outcome: Progressing  Goal: Establishment of infant feeding pattern  Description:  Postpartum OB-Pregnancy care plan goal which identifies if the mother is establishing a feeding pattern with their   2024 by Toyin Crocker RN  Outcome: Progressing  2024 by Toyin Crocker RN  Outcome: Progressing  Goal: Incisions, wounds, or drain sites healing without S/S of infection  2024 by Toyin Crocker RN  Outcome: Progressing  2024 by Toyin Crocker RN  Outcome: Progressing     Problem: Safety - Adult  Goal: Free from fall injury  2024 by Toyin Crocker RN  Outcome: Progressing  2024 by Toyin Crocker RN  Outcome: Progressing     Problem:  Discharge Planning  Goal: Discharge to home or other facility with appropriate resources  6/5/2024 0145 by Toyin Crocker, RN  Outcome: Progressing  6/5/2024 0144 by Toyin Crocker, RN  Outcome: Progressing

## 2024-06-05 NOTE — PROGRESS NOTES
2135-Provider Manuel calls unit for update on patient and wants writer to perform SVE  2140-Writer performed SVE and notifies Manuel that patient is a lip/rim , remains comfortable with the epidural and has been positioned on the ruth

## 2024-06-05 NOTE — L&D DELIVERY NOTE
Kush Vásquez Kris [[id number]]      Labor Events     Labor: No   Steroids: None  Cervical Ripening Date/Time:        Rupture Date/Time:  24 16:35:00   Rupture Type: AROM, Intact  Fluid Color: Clear, Pink  Fluid Odor: None  Fluid Volume: Large  Induction: Oxytocin, AROM  Labor Complications: None              Anesthesia    Method: Epidural       Labor Event Times      Labor onset date/time:        Dilation complete date/time:        Start pushing date/time:  2024 23:56:00   Decision date/time (emergent ):            Labor Length    3rd stage: 0h 09m       Delivery Details      Delivery Date: 24 Delivery Time: 00:36:00   Delivery Type: Vaginal, Spontaneous              [place] Presentation    Presentation: Vertex  Position: Left  _: Occiput  _: Anterior       Shoulder Dystocia    Shoulder Dystocia Present?: No       Assisted Delivery Details    Forceps Attempted?: No  Vacuum Extractor Attempted?: No                           Cord    Vessels: 3 Vessels  Complications: None  Delayed Cord Clamping?: Yes  Cord Clamped Date/Time: 2024 00:39:00  Cord Blood Disposition: Lab  Gases Sent?: No   Cord Comments:  PROCEDURAL - OBSTETRIC - CORD OBSERVATION   8 inch Cord segment sent to lab for holding             Placenta    Date/Time: 2024 00:45:00  Removal: Spontaneous  Appearance: Intact  Disposition: Discarded       Lacerations    Episiotomy: None  Perineal Lacerations: 1st  Other Lacerations: no non-perineal laceration       Vaginal Counts    Initial Count Personnel: CIARA ALLEN RN  Initial Count Verified By: NATALY DE RN  Intial Sponge Count: Correct  Intial Instruments Count: Correct   Final Count Personnel: DANISHA FLOWERS  Final Count Verified By: SARAH FLOWERS  Accurate Final Count?: Yes       Blood Loss  Mother: Fahad Kris P #[id number]     Start of Mother's Information      Delivery Blood Loss  24 1236 - 24 0924      Quantitative Blood Loss (mL) Hospital Encounter 740

## 2024-06-05 NOTE — PROGRESS NOTES
2241- writer spoke with Manuel to update on most recent SVE as well as current contraction pattern, provider would like patient in hands and knees position  2244- Writer speaks with patient and discusses benefits of hands and knees ,patient agreeable , placed on hands and knees with peanut ball for upper body support

## 2024-06-05 NOTE — PLAN OF CARE
Problem: Pain  Goal: Verbalizes/displays adequate comfort level or baseline comfort level  2024 09 by Elena Friedman RN  Outcome: Progressing  Flowsheets (Taken 2024 0820)  Verbalizes/displays adequate comfort level or baseline comfort level:   Encourage patient to monitor pain and request assistance   Assess pain using appropriate pain scale   Administer analgesics based on type and severity of pain and evaluate response  2024 by Toyin Crocker RN  Outcome: Progressing  2024 by Toyin Crocker RN  Outcome: Progressing     Problem: Postpartum  Goal: Experiences normal postpartum course  Description:  Postpartum OB-Pregnancy care plan goal which identifies if the mother is experiencing a normal postpartum course  2024 by Elena Friedman RN  Outcome: Progressing  2024 by Toyin Crocker RN  Outcome: Progressing  2024 by Toyin Crocker RN  Outcome: Progressing  Goal: Appropriate maternal -  bonding  Description:  Postpartum OB-Pregnancy care plan goal which identifies if the mother and  are bonding appropriately  2024 by Elena Friedman RN  Outcome: Progressing  2024 by Toyin Crocker RN  Outcome: Progressing  2024 by Toyin Crocker RN  Outcome: Progressing  Goal: Establishment of infant feeding pattern  Description:  Postpartum OB-Pregnancy care plan goal which identifies if the mother is establishing a feeding pattern with their   2024 by Elena Friedman RN  Outcome: Progressing  2024 by Toyin Crocker RN  Outcome: Progressing  2024 by Toyin Crocker RN  Outcome: Progressing  Goal: Incisions, wounds, or drain sites healing without S/S of infection  2024 by Elena Friedman RN  Outcome: Progressing  2024 by Toyin Crocker RN  Outcome: Progressing  2024 by Toyin Crocker RN  Outcome: Progressing     Problem: Safety - Adult  Goal: Free from fall

## 2024-06-06 VITALS
BODY MASS INDEX: 33.04 KG/M2 | RESPIRATION RATE: 16 BRPM | WEIGHT: 175 LBS | HEIGHT: 61 IN | HEART RATE: 91 BPM | SYSTOLIC BLOOD PRESSURE: 115 MMHG | TEMPERATURE: 99.1 F | DIASTOLIC BLOOD PRESSURE: 70 MMHG | OXYGEN SATURATION: 97 %

## 2024-06-06 PROCEDURE — 99024 POSTOP FOLLOW-UP VISIT: CPT | Performed by: ADVANCED PRACTICE MIDWIFE

## 2024-06-06 PROCEDURE — 6370000000 HC RX 637 (ALT 250 FOR IP): Performed by: ADVANCED PRACTICE MIDWIFE

## 2024-06-06 RX ADMIN — IBUPROFEN 800 MG: 800 TABLET, FILM COATED ORAL at 08:52

## 2024-06-06 RX ADMIN — IBUPROFEN 800 MG: 800 TABLET, FILM COATED ORAL at 00:25

## 2024-06-06 ASSESSMENT — PAIN SCALES - GENERAL: PAINLEVEL_OUTOF10: 2

## 2024-06-06 ASSESSMENT — PAIN - FUNCTIONAL ASSESSMENT: PAIN_FUNCTIONAL_ASSESSMENT: ACTIVITIES ARE NOT PREVENTED

## 2024-06-06 ASSESSMENT — PAIN DESCRIPTION - ORIENTATION: ORIENTATION: LOWER

## 2024-06-06 ASSESSMENT — PAIN DESCRIPTION - LOCATION: LOCATION: ABDOMEN

## 2024-06-06 ASSESSMENT — PAIN DESCRIPTION - DESCRIPTORS: DESCRIPTORS: CRAMPING

## 2024-06-06 NOTE — DISCHARGE INSTRUCTIONS
Follow-up with your OB doctor as specified.    [facility] OB Department phone: [phone]    Dr. Ramiro Griffin [facility]  Dr. Dylon Christianson [facility]  [address]  [place] [address]   [place] [phone]   or [place] [phone]       DIET  Eat a well balanced diet focusing on foods high in fiber and protein.   Drink plenty of fluids especially water.  To avoid constipation you may take a mild stool softener as recommended by your doctor or midwife.    ACTIVITY  Gradually increase your activity.  Resume exercise regimen only after advice by your doctor or midwife.  Avoid lifting anything heavier than a gallon of milk for SIX weeks.   Avoid driving until your doctor or midwife has given their approval.  Rise slowly from a lying to sitting and then a standing position.  Climb stairs one at a time.  Use caution when carrying your baby up and down the stairs.  NO SEXUAL Activity for 6 weeks or until advised by your doctor; Nothing in vagina: intercourse, tampons, or douching. No swimming or tub baths x 6 weeks.  Be prepared to discuss family planning at your follow-up OB visit.   You may feel tired or have a lack of energy.  You may continue your prenatal vitamin to replenish nutrients post delivery.  Nap when baby naps to catch up on sleep.     EMOTIONS  You may feel barbosa, sad, teary, & overwhelmed.  Contact your OB provider if you feel you may be showing signs of postpartum depression, or have thoughts of harming yourself or your infant.  If infant will not stop crying, contact another adult for help or place infant in their crib on their back and take a break.  NEVER shake your infant.      BLEEDING  Vaginal bleeding will decrease in amount over the next few weeks.  You will notice that as your activity increases, your flow may increase.  This is your body's way of telling you, you need to take things easier and rest more often.  Call your care provider if you are saturating more  well-being.  You are unable to sleep, eat, or are having thoughts of harming yourself or your baby.   You have a red, warm, tender area in your calf.

## 2024-06-06 NOTE — PLAN OF CARE
Problem: Pain  Goal: Verbalizes/displays adequate comfort level or baseline comfort level  Outcome: Progressing  Flowsheets (Taken 2024 2015)  Verbalizes/displays adequate comfort level or baseline comfort level:   Encourage patient to monitor pain and request assistance   Assess pain using appropriate pain scale   Administer analgesics based on type and severity of pain and evaluate response   Implement non-pharmacological measures as appropriate and evaluate response   Consider cultural and social influences on pain and pain management   Notify Licensed Independent Practitioner if interventions unsuccessful or patient reports new pain     Problem: Postpartum  Goal: Experiences normal postpartum course  Description:  Postpartum OB-Pregnancy care plan goal which identifies if the mother is experiencing a normal postpartum course  Outcome: Progressing  Goal: Appropriate maternal -  bonding  Description:  Postpartum OB-Pregnancy care plan goal which identifies if the mother and  are bonding appropriately  Outcome: Progressing  Goal: Establishment of infant feeding pattern  Description:  Postpartum OB-Pregnancy care plan goal which identifies if the mother is establishing a feeding pattern with their   Outcome: Progressing  Goal: Incisions, wounds, or drain sites healing without S/S of infection  Outcome: Progressing     Problem: Safety - Adult  Goal: Free from fall injury  Outcome: Progressing     Problem: Discharge Planning  Goal: Discharge to home or other facility with appropriate resources  Outcome: Progressing

## 2024-06-06 NOTE — FLOWSHEET NOTE
Patient off unit in stable condition.    Departure Mode: with significant other. and in private car    Mobility at Departure: ambulatory    Discharged to: private residence    Time of Discharge: 1110

## 2024-06-06 NOTE — PLAN OF CARE
RN  Outcome: Completed  6/6/2024 0858 by Ayleen Louise RN  Outcome: Progressing     Problem: Discharge Planning  Goal: Discharge to home or other facility with appropriate resources  6/6/2024 0944 by Ayleen Louise RN  Outcome: Completed  6/6/2024 0858 by Ayleen Louise RN  Outcome: Progressing

## 2024-06-06 NOTE — PROGRESS NOTES
Department of Obstetrics and Gynecology  Labor and Delivery       Post Partum Progress Note             SUBJECTIVE: Patient is up eating breakfast at this time.  Patient denies any needs or concerns states breast-feeding is going well and would like to go home today.    OBJECTIVE:      Vitals:  /70   Pulse 91   Temp 99.1 °F (37.3 °C) (Oral) Comment: pt had recently eaten some tater tots  Resp 16   Ht 1.549 m (5' 1\")   Wt 79.4 kg (175 lb)   LMP 2023   SpO2 97%   Breastfeeding Unknown   BMI 33.07 kg/m²   Patient Vitals for the past 24 hrs:   BP Temp Temp src Pulse Resp SpO2   24 0846 115/70 99.1 °F (37.3 °C) Oral 91 16 97 %   24 0302 119/68 97.2 °F (36.2 °C) Oral 77 18 --   24 122/69 99.4 °F (37.4 °C) Oral 83 18 --   24 1405 114/61 97.9 °F (36.6 °C) Oral (!) 104 16 --         ABDOMEN: normal shape, position and consistency  GENITAL/URINARY:  External Genitalia:  General appearance; normal, Hair distribution; normal, Lesions absent  Uterus:  Size normal, Contour normal  Breast:normal appearance, no masses or tenderness  Cor: RRR no Murmurs  Pulmonary: clear to auscultation anterior and posterior  Extremities: no Clubbing cyanosis or ecchymosis    DATA:          ASSESSMENT :      Principal Problem:    40 weeks gestation of pregnancy    Active Problems:     (normal spontaneous vaginal delivery)        Plan:  Discharge home today

## 2024-06-06 NOTE — DISCHARGE SUMMARY
Obstetrical Discharge Form        Gestational Age:40w1d    Antepartum complications: post-term    Date of Delivery: 24    Type of Delivery:        Delivered By:  CIARA CARO CNSTEPHANIE    Assisted By:N/A      Baby: male    Anesthesia:  epidural      Intrapartum complications: None    Feeding method: breast    Blood type: O POSITIVE      Rubella:    Rubella Antibody, IgG   Date Value Ref Range Status   10/04/2023 61.39 IU/mL Final     Comment:                 REFERENCE RANGE:  <5.0       NON-REACTIVE (non-immune)  5.0 TO 9.9 EQUIVOCAL  >=10.0     REACTIVE     (immune)             T. Pallidium, IGG:    T. pallidum, IgG   Date Value Ref Range Status   10/04/2023 NONREACTIVE NONREACTIVE Final     Comment:           T. pallidum antibodies are not detected.  There is no serological evidence of infection with T. pallidum (early primary syphilis   cannot be excluded).  Retest in 2-4 weeks if syphilis is clinically suspect.             Hepatitis B Surface Antigen:   Hepatitis B Surface Ag   Date Value Ref Range Status   10/04/2023 NONREACTIVE NONREACTIVE Final     HIV:    HIV Ag/Ab   Date Value Ref Range Status   10/04/2023 NONREACTIVE NONREACTIVE Final     Comment:     No laboratory evidence of HIV infection.  If acute HIV infection is suspected, consider   testing for HIV-1 RNA.         Results for orders placed or performed during the hospital encounter of 24   CBC auto differential   Result Value Ref Range    WBC 12.0 (H) 3.5 - 11.3 k/uL    RBC 3.92 (L) 3.95 - 5.11 m/uL    Hemoglobin 12.3 11.9 - 15.1 g/dL    Hematocrit 36.6 36.3 - 47.1 %    MCV 93.4 82.6 - 102.9 fL    MCH 31.4 25.2 - 33.5 pg    MCHC 33.6 28.4 - 34.8 g/dL    RDW 15.1 (H) 11.8 - 14.4 %    Platelets 223 138 - 453 k/uL    MPV 10.4 8.1 - 13.5 fL    NRBC Automated 0.0 0.0 per 100 WBC    Neutrophils % 72 (H) 36 - 65 %    Lymphocytes % 17 (L) 24 - 43 %    Monocytes % 7 3 - 12 %    Eosinophils % 1 1 - 4 %    Basophils % 1 0 - 2 %    Immature Granulocytes

## 2024-06-07 NOTE — ANESTHESIA POSTPROCEDURE EVALUATION
Department of Anesthesiology  Postprocedure Note    Patient: Kris Vásquez  MRN: [id number]  YOB: 2000  Date of evaluation: 6/7/2024    Procedure Summary       Date: 06/04/24 Room / Location:     Anesthesia Start: 1833 Anesthesia Stop: 06/05/24 0036    Procedure: Labor Analgesia Diagnosis:     Scheduled Providers:  Responsible Provider: Nico Brumfield APRN - CRNA    Anesthesia Type: epidural ASA Status: 2            Anesthesia Type: No value filed.    Jessica Phase I:      Jessica Phase II:      Anesthesia Post Evaluation    Patient location during evaluation: bedside  Patient participation: complete - patient participated  Level of consciousness: awake and alert  Airway patency: patent  Nausea & Vomiting: no nausea and no vomiting  Cardiovascular status: hemodynamically stable  Respiratory status: acceptable  Hydration status: stable  Multimodal analgesia pain management approach  Pain management: adequate        No notable events documented.

## 2024-06-27 ENCOUNTER — TELEPHONE (OUTPATIENT)
Dept: OBGYN | Age: 24
End: 2024-06-27

## 2024-06-27 NOTE — TELEPHONE ENCOUNTER
Post Partum Phone Call Vaginal delivery  Follow Up 2 weeks sheridan after discharge      Date of service: 2024    Kris Vásquez  Is a 24 y.o.      Delivery date 24    Type of delivery:       Laceration:Yes, First degree lac      Infant gender: male    Infant name: Colsen     Are you breast or bottle feeding? Breast     How did first pediatrician visit go: good     How are you feeling: good     How is your bleeding: light , minimal     Any questions or concerns: nope     Are you on any medications for depression. no    Information given to pt. N/a pt doing well and feeling well     EPPDS:       2024     4:22 PM   Postpartum Depression Scale   I have been able to laugh and see the funny side of things As much as I always could   I have looked forward with enjoyment to things As much as I ever did   I have blamed myself unnecessarily when things went wrong No, never   I have been anxious or worried for no good reason No, not at all   I have felt scared or panicky for no good reason No, not at all   I haven't been able to cope lately No, I have been coping as well as ever   I have been so unhappy that I have had difficulty sleeping Not at all   I have felt sad or miserable No, not at all   I have been so unhappy that I have been crying No, never   The thought of harming myself has occurred to me Never   Total 0       If above 10 schedule pt and appointment. Within a few days    Lets schedule your appt now -   Date - 24  What for - 6 wk ppv     History of thyroid issues?No  If yes order TSH with reflux to have done in lab within a week

## 2024-07-18 ENCOUNTER — POSTPARTUM VISIT (OUTPATIENT)
Dept: OBGYN | Age: 24
End: 2024-07-18

## 2024-07-18 VITALS
BODY MASS INDEX: 31.15 KG/M2 | DIASTOLIC BLOOD PRESSURE: 78 MMHG | HEIGHT: 61 IN | SYSTOLIC BLOOD PRESSURE: 118 MMHG | WEIGHT: 165 LBS

## 2024-07-18 PROBLEM — Z3A.38 38 WEEKS GESTATION OF PREGNANCY: Status: RESOLVED | Noted: 2024-05-21 | Resolved: 2024-07-18

## 2024-07-18 NOTE — PROGRESS NOTES
POSTPARTUM EXAM    Date of service: 2024    Kris Vásquez  Is a 24 y.o.  female    PT's PCP is: Harpreet Lopez DO     : 2000     OB History    Para Term  AB Living   2 2 2     2   SAB IAB Ectopic Molar Multiple Live Births           0 2      # Outcome Date GA Lbr Augustus/2nd Weight Sex Delivery Anes PTL Lv   2 Term 24 40w1d  4.45 kg (9 lb 13 oz) M Vag-Spont EPI N SYMONE   1 Term 22 40w1d 04:14 / 00:37 3.852 kg (8 lb 7.9 oz) F Vag-Spont EPI N SYMONE      Complications: Meconium at birth, Gestational diabetes        Social History     Tobacco Use   Smoking Status Never   Smokeless Tobacco Never         Social History     Substance and Sexual Activity   Alcohol Use Not Currently         Delivery date 24    Type of delivery:  Spontaneous vaginal delivery    Laceration:No,     Infant gender: male    Infant name: colsen    Are you breast or bottle feeding?  Both     Have you been sexually active since delivery? No    Vital Signs: Last menstrual period 2023, unknown if currently breastfeeding.     Labs:    Blood Type and Rh: O POSITIVE          Allergies: Patient has no known allergies.      Current Outpatient Medications:     Prenatal MV-Min-Fe Fum-FA-DHA (PRENATAL 1 PO), Take by mouth, Disp: , Rfl:     Last Yearly date:  22    Last pap date and results: 22    Last HPV date and results: 22 neg    Chief Complaint   Patient presents with    Postpartum Care     Pt had a vaginal delivery on 24. Pt states no complaints or concerns. Pt is currently supplementing with breast and bottle feeding. Pt states she has not been sexually active since delivery. Pt declines wanting any cycle control. Pt declines iron check.         How many Hours of sleep do you get a night: 7-8 hours    Do you have a normal appetite: yes    Any problems or pain: no    Do you feel like you coping well: yes    Is baby sleeping:yes    How is baby eating: yes    How did first pediatrician

## 2024-11-25 ENCOUNTER — E-VISIT (OUTPATIENT)
Dept: OBGYN | Age: 24
End: 2024-11-25
Payer: COMMERCIAL

## 2024-11-25 DIAGNOSIS — B37.31 VAGINA, CANDIDIASIS: Primary | ICD-10-CM

## 2024-11-25 PROCEDURE — 99421 OL DIG E/M SVC 5-10 MIN: CPT | Performed by: ADVANCED PRACTICE MIDWIFE

## 2024-11-25 RX ORDER — FLUCONAZOLE 150 MG/1
150 TABLET ORAL
Qty: 3 TABLET | Refills: 0 | Status: SHIPPED | OUTPATIENT
Start: 2024-11-25

## 2024-11-25 NOTE — PROGRESS NOTES
HPI: as per patient provided history  Exam: N/A (electronic visit)  ASSESSMENT/PLAN:  1. Vagina, candidiasis    - fluconazole (DIFLUCAN) 150 MG tablet; Take 1 tablet by mouth every 3 days  Dispense: 3 tablet; Refill: 0       Patient instructed to call the office if worsens, or fails to improve as anticipated.    5-10 minutes were spent on the digital evaluation and management of this patient.

## 2025-03-13 ENCOUNTER — OFFICE VISIT (OUTPATIENT)
Dept: OBGYN | Age: 25
End: 2025-03-13
Payer: COMMERCIAL

## 2025-03-13 ENCOUNTER — HOSPITAL ENCOUNTER (OUTPATIENT)
Age: 25
Setting detail: SPECIMEN
Discharge: HOME | End: 2025-03-13
Payer: COMMERCIAL

## 2025-03-13 VITALS
DIASTOLIC BLOOD PRESSURE: 78 MMHG | SYSTOLIC BLOOD PRESSURE: 118 MMHG | WEIGHT: 156 LBS | HEIGHT: 61 IN | BODY MASS INDEX: 29.45 KG/M2

## 2025-03-13 DIAGNOSIS — Z01.419 ENCOUNTER FOR WELL WOMAN EXAM WITH ROUTINE GYNECOLOGICAL EXAM: ICD-10-CM

## 2025-03-13 DIAGNOSIS — Z01.419 ENCOUNTER FOR WELL WOMAN EXAM WITH ROUTINE GYNECOLOGICAL EXAM: Primary | ICD-10-CM

## 2025-03-13 PROCEDURE — 99395 PREV VISIT EST AGE 18-39: CPT | Performed by: ADVANCED PRACTICE MIDWIFE

## 2025-03-13 PROCEDURE — G0145 SCR C/V CYTO,THINLAYER,RESCR: HCPCS

## 2025-03-13 ASSESSMENT — PATIENT HEALTH QUESTIONNAIRE - PHQ9
SUM OF ALL RESPONSES TO PHQ QUESTIONS 1-9: 0
1. LITTLE INTEREST OR PLEASURE IN DOING THINGS: NOT AT ALL
SUM OF ALL RESPONSES TO PHQ QUESTIONS 1-9: 0
2. FEELING DOWN, DEPRESSED OR HOPELESS: NOT AT ALL

## 2025-03-13 NOTE — PROGRESS NOTES
intercourse: No    Last Yearly date:  2023    Last pap date : Date of last Cervical Cancer screen (HPV or PAP): 2/8/2022     results: neg     Last HPV date and results: never     Has pt ever had an abnormal:No    IF yes result and date: n/a     Mammogram Result (most recent): n/a  No results found for this or any previous visit from the past 3650 days.        DEXA Result (most recent):  No results found for this or any previous visit from the past 3650 days.        Last colorectal screen- type:n/a     Do you do self breast exams: Yes    Pt requests chaperone: No    Past Medical History:   Diagnosis Date    Gestational diabetes mellitus     Gestational diabetes mellitus (GDM) affecting pregnancy 07/14/2022       No past surgical history on file.    Family History   Problem Relation Age of Onset    Diabetes Paternal Grandfather     Colon Cancer Paternal Grandfather     Liver Cancer Paternal Grandfather     No Known Problems Paternal Grandmother     No Known Problems Maternal Grandmother     Heart Disease Maternal Grandfather     Hypertension Father     Diabetes Father     Hypertension Mother     No Known Problems Brother     No Known Problems Brother     Breast Cancer Neg Hx     Ovarian Cancer Neg Hx     Clotting Disorder Neg Hx        Chief Complaint   Patient presents with    Gynecologic Exam     Yearly-PAP, pt states no issues or concerns. Pt declines std testing           PE:  Vital Signs  Blood pressure 118/78, height 1.549 m (5' 1\"), weight 70.8 kg (156 lb), last menstrual period 03/13/2025, not currently breastfeeding.  Estimated body mass index is 29.48 kg/m² as calculated from the following:    Height as of this encounter: 1.549 m (5' 1\").    Weight as of this encounter: 70.8 kg (156 lb).    Labs:    No results found for this visit on 03/13/25.    PHQ-9 Total Score: 0 (3/13/2025  2:41 PM)      NURSE: KILEY     HPI: Patient here today for routine gynecological exam.  Patient denies needs or concerns at this

## 2025-03-13 NOTE — PATIENT INSTRUCTIONS
SURVEY:    You may be receiving a survey regarding your visit today.    Please complete the survey to enable us to provide the highest quality of care to you and your family.    We strive for all 5's - thank you    If you cannot score us a very good (5) on any question, please call the office to discuss this with Es (our ). We would like to discuss how we could of made your experience a very good one.    Es: [phone]    Thank you.

## 2025-03-26 ENCOUNTER — RESULTS FOLLOW-UP (OUTPATIENT)
Dept: OBGYN | Age: 25
End: 2025-03-26

## 2025-03-26 LAB — CYTOLOGY REPORT: NORMAL

## 2025-04-17 LAB
CHOLEST SERPL-MCNC: 145 MG/DL (ref 0–199)
CHOLESTEROL/HDL RATIO: 3.1
GLUCOSE SERPL-MCNC: 92 MG/DL (ref 70–99)
HDLC SERPL-MCNC: 47 MG/DL
LDLC SERPL CALC-MCNC: 85 MG/DL (ref 0–100)
PATIENT FASTING?: YES
TRIGL SERPL-MCNC: 67 MG/DL
VLDLC SERPL CALC-MCNC: 13 MG/DL (ref 1–30)